# Patient Record
Sex: MALE | Race: OTHER | HISPANIC OR LATINO | URBAN - METROPOLITAN AREA
[De-identification: names, ages, dates, MRNs, and addresses within clinical notes are randomized per-mention and may not be internally consistent; named-entity substitution may affect disease eponyms.]

---

## 2021-12-02 ENCOUNTER — OFFICE VISIT (OUTPATIENT)
Dept: FAMILY MEDICINE | Facility: CLINIC | Age: 46
End: 2021-12-02
Payer: OTHER GOVERNMENT

## 2021-12-02 VITALS — OXYGEN SATURATION: 96 % | HEART RATE: 55 BPM | BODY MASS INDEX: 32.72 KG/M2 | HEIGHT: 71 IN | WEIGHT: 233.69 LBS

## 2021-12-02 DIAGNOSIS — G43.109 MIGRAINE WITH AURA AND WITHOUT STATUS MIGRAINOSUS, NOT INTRACTABLE: Primary | ICD-10-CM

## 2021-12-02 PROCEDURE — 99999 PR PBB SHADOW E&M-NEW PATIENT-LVL IV: ICD-10-PCS | Mod: PBBFAC,,, | Performed by: NURSE PRACTITIONER

## 2021-12-02 PROCEDURE — 99203 PR OFFICE/OUTPT VISIT, NEW, LEVL III, 30-44 MIN: ICD-10-PCS | Mod: S$PBB,,, | Performed by: NURSE PRACTITIONER

## 2021-12-02 PROCEDURE — 99204 OFFICE O/P NEW MOD 45 MIN: CPT | Mod: PBBFAC,PO | Performed by: NURSE PRACTITIONER

## 2021-12-02 PROCEDURE — 99203 OFFICE O/P NEW LOW 30 MIN: CPT | Mod: S$PBB,,, | Performed by: NURSE PRACTITIONER

## 2021-12-02 PROCEDURE — 99999 PR PBB SHADOW E&M-NEW PATIENT-LVL IV: CPT | Mod: PBBFAC,,, | Performed by: NURSE PRACTITIONER

## 2021-12-02 RX ORDER — OMEPRAZOLE 20 MG/1
20 CAPSULE, DELAYED RELEASE ORAL DAILY
COMMUNITY

## 2021-12-02 RX ORDER — AMITRIPTYLINE HYDROCHLORIDE 25 MG/1
25 TABLET, FILM COATED ORAL NIGHTLY PRN
COMMUNITY

## 2021-12-02 RX ORDER — SUMATRIPTAN SUCCINATE 100 MG/1
100 TABLET ORAL
COMMUNITY
End: 2021-12-15

## 2021-12-15 ENCOUNTER — OFFICE VISIT (OUTPATIENT)
Dept: NEUROLOGY | Facility: CLINIC | Age: 46
End: 2021-12-15
Payer: OTHER GOVERNMENT

## 2021-12-15 ENCOUNTER — TELEPHONE (OUTPATIENT)
Dept: FAMILY MEDICINE | Facility: CLINIC | Age: 46
End: 2021-12-15
Payer: OTHER GOVERNMENT

## 2021-12-15 VITALS
HEIGHT: 71 IN | TEMPERATURE: 98 F | HEART RATE: 55 BPM | BODY MASS INDEX: 32.55 KG/M2 | DIASTOLIC BLOOD PRESSURE: 84 MMHG | WEIGHT: 232.5 LBS | RESPIRATION RATE: 17 BRPM | SYSTOLIC BLOOD PRESSURE: 130 MMHG

## 2021-12-15 DIAGNOSIS — G43.109 MIGRAINE WITH AURA AND WITHOUT STATUS MIGRAINOSUS, NOT INTRACTABLE: ICD-10-CM

## 2021-12-15 DIAGNOSIS — G43.719 INTRACTABLE CHRONIC MIGRAINE WITHOUT AURA AND WITHOUT STATUS MIGRAINOSUS: Primary | ICD-10-CM

## 2021-12-15 DIAGNOSIS — G47.30 SLEEP APNEA, UNSPECIFIED TYPE: ICD-10-CM

## 2021-12-15 DIAGNOSIS — M79.18 CERVICAL MYOFASCIAL PAIN SYNDROME: ICD-10-CM

## 2021-12-15 PROCEDURE — 99205 PR OFFICE/OUTPT VISIT, NEW, LEVL V, 60-74 MIN: ICD-10-PCS | Mod: S$PBB,,, | Performed by: NURSE PRACTITIONER

## 2021-12-15 PROCEDURE — 99215 OFFICE O/P EST HI 40 MIN: CPT | Mod: PBBFAC,PO | Performed by: NURSE PRACTITIONER

## 2021-12-15 PROCEDURE — 99205 OFFICE O/P NEW HI 60 MIN: CPT | Mod: S$PBB,,, | Performed by: NURSE PRACTITIONER

## 2021-12-15 PROCEDURE — 99999 PR PBB SHADOW E&M-EST. PATIENT-LVL V: ICD-10-PCS | Mod: PBBFAC,,, | Performed by: NURSE PRACTITIONER

## 2021-12-15 PROCEDURE — 99999 PR PBB SHADOW E&M-EST. PATIENT-LVL V: CPT | Mod: PBBFAC,,, | Performed by: NURSE PRACTITIONER

## 2021-12-15 RX ORDER — ROSUVASTATIN CALCIUM 20 MG/1
20 TABLET, COATED ORAL DAILY
COMMUNITY

## 2021-12-15 RX ORDER — RIZATRIPTAN BENZOATE 10 MG/1
TABLET ORAL
Qty: 10 TABLET | Refills: 11 | Status: SHIPPED | OUTPATIENT
Start: 2021-12-15 | End: 2022-02-21 | Stop reason: ALTCHOICE

## 2021-12-15 RX ORDER — METFORMIN HYDROCHLORIDE 500 MG/1
500 TABLET, EXTENDED RELEASE ORAL
COMMUNITY

## 2021-12-15 RX ORDER — TIZANIDINE 4 MG/1
TABLET ORAL
Qty: 30 TABLET | Refills: 11 | Status: SHIPPED | OUTPATIENT
Start: 2021-12-15

## 2021-12-30 ENCOUNTER — CLINICAL SUPPORT (OUTPATIENT)
Dept: REHABILITATION | Facility: HOSPITAL | Age: 46
End: 2021-12-30
Payer: OTHER GOVERNMENT

## 2021-12-30 DIAGNOSIS — R51.9 CHRONIC INTRACTABLE HEADACHE, UNSPECIFIED HEADACHE TYPE: ICD-10-CM

## 2021-12-30 DIAGNOSIS — M54.2 NECK PAIN: ICD-10-CM

## 2021-12-30 DIAGNOSIS — R26.89 DECREASED FUNCTIONAL MOBILITY: ICD-10-CM

## 2021-12-30 DIAGNOSIS — M79.18 CERVICAL MYOFASCIAL PAIN SYNDROME: ICD-10-CM

## 2021-12-30 DIAGNOSIS — G89.29 CHRONIC INTRACTABLE HEADACHE, UNSPECIFIED HEADACHE TYPE: ICD-10-CM

## 2021-12-30 DIAGNOSIS — G43.719 INTRACTABLE CHRONIC MIGRAINE WITHOUT AURA AND WITHOUT STATUS MIGRAINOSUS: ICD-10-CM

## 2021-12-30 PROCEDURE — 97110 THERAPEUTIC EXERCISES: CPT | Mod: PN

## 2021-12-30 PROCEDURE — 97161 PT EVAL LOW COMPLEX 20 MIN: CPT | Mod: PN

## 2022-01-04 ENCOUNTER — CLINICAL SUPPORT (OUTPATIENT)
Dept: REHABILITATION | Facility: HOSPITAL | Age: 47
End: 2022-01-04
Payer: OTHER GOVERNMENT

## 2022-01-04 DIAGNOSIS — M54.2 NECK PAIN: ICD-10-CM

## 2022-01-04 DIAGNOSIS — R51.9 CHRONIC INTRACTABLE HEADACHE, UNSPECIFIED HEADACHE TYPE: ICD-10-CM

## 2022-01-04 DIAGNOSIS — G89.29 CHRONIC INTRACTABLE HEADACHE, UNSPECIFIED HEADACHE TYPE: ICD-10-CM

## 2022-01-04 DIAGNOSIS — R26.89 DECREASED FUNCTIONAL MOBILITY: ICD-10-CM

## 2022-01-04 PROCEDURE — 97140 MANUAL THERAPY 1/> REGIONS: CPT | Mod: PN,CQ

## 2022-01-04 PROCEDURE — 97110 THERAPEUTIC EXERCISES: CPT | Mod: PN,CQ

## 2022-01-04 NOTE — PROGRESS NOTES
[facility]   Physical Therapy Treatment Note     Name: Yonatan Allen  Clinic Number: [id number]    Therapy Diagnosis:   Encounter Diagnoses   Name Primary?    Neck pain     Chronic intractable headache, unspecified headache type     Decreased functional mobility      Physician: Camryn Webb NP    Visit Date: 1/4/2022    Physician Orders: PT Eval and Treat    Medical Diagnosis from Referral:   G43.719 (ICD-10-CM) - Intractable chronic migraine without aura and without status migrainosus   M79.18 (ICD-10-CM) - Cervical myofascial pain syndrome         Evaluation Date: 12/30/2021  Authorization Period Expiration: 6/30/22  Plan of Care Expiration: 2/25/22  Visit # / Visits authorized: 1/ 12 (+1 from eval)      Precautions: Standard    PTA Visit #: 1/5     Time In: 8:02  Time Out: 8:53  Total Billable Time: 50 minutes    SUBJECTIVE     Pt reports: overall less tight but still definitely feels tight. States his doctor gave him a muscle relaxer that seems to help with sleeping.  He was somewhat compliant with home exercise program, did stretches but not lying down exerises.  Response to previous treatment: felt okay  Functional change: too soon to tell    Pain: 5/10 tightness, not really painful  Location: bilateral tops of shoulders and into neck some      OBJECTIVE     Objective Measures updated at progress report unless specified.     Treatment     YONATAN received the treatments listed below:      therapeutic exercises to develop strength, ROM, flexibility and posture for 35 minutes including:  Supine nods x10, 5 sec hold  Supine cervical retraction x10, 5 sec hold  Prone sh ext x10, 3# (muscle fatigue noted)  Prone horiz abd x 10, 0# (muscle fatigue noted)  Seated upper trap stretch 3x20 sec  Seated levator scap stretch 3x20 sec  Bruegger's 15 x 3 sec  ABCs on wall for scap stab x 1  Lower trap wall slides with lift off x 10 with 3 sec hold (tactile cue occasionally to ensure lower trap  activation)  Sh ext with B tubing 2x10  Corner pec stretch 3x20 sec     May add: scap clocks      manual therapy techniques: Soft tissue Mobilization were applied to the: B UT, cervical paraspinals and suboccipitals for 15 minutes, including:  STM to B UT, cervical paraspinals  Suboccipital release--pt reported 0/10 pain post suboccipital release  Pt educated on self suboccipital release with tennis balls      Patient Education and Home Exercises     Home Exercises Provided and Patient Education Provided     Education provided:   - greater awareness of scapular positioning and avoidance of shoulder hiking  - importance of HEP completion as able for better strength and stability    Written Home Exercises Provided: yes. Exercises were reviewed and ROMARIO was able to demonstrate them prior to the end of the session.  ROMARIO demonstrated good  understanding of the education provided. See EMR under Patient Instructions in Notes section for exercises provided during therapy sessions    ASSESSMENT     Patient demonstrated overall good tolerance to progression of exercises, noted fatigue to mid traps, rhomboids, and lower traps with additional exercises. Moderate tightness continues to bilateral suboccipitals, L > R more tender with manual. Occasional cues throughout session to avoid UT compensation and shoulder hiking. Updated HEP to add some standing exercises and patient is eager to improve postural strength.    ROMARIO Is progressing well towards his goals.   Pt prognosis is Good.     Pt will continue to benefit from skilled outpatient physical therapy to address the deficits listed in the problem list box on initial evaluation, provide pt/family education and to maximize pt's level of independence in the home and community environment.     Pt's spiritual, cultural and educational needs considered and pt agreeable to plan of care and goals.     Anticipated barriers to physical therapy: work schedule    Goals:   Short Term  Goals: 4 weeks (progressing, not met)  1.Report decreased neck and headache pain  <   / =  4  /10  to increase tolerance for ADLs  2. Pt will report decreased headaches to 2x/week for increased tolerance for ADLs  3. Increased R UE strength by 1/3 muscle grade in all deficient planes to increase tolerance for ADL and work activities.  4.  Increased L UE strength by 1/3 muscle grade in all deficient planes to increase tolerance for ADL and work activities.  5. Pt to tolerate HEP to improve ROM and independence with ADL's        Long Term Goals: 8 weeks (progressing, not met)  1.Report decreased neck/headache pain  <   / =  2  /10  to increase tolerance for ADLs  2.Pt will report decreased headaches to 1x/week for increased tolerance for ADLs  3.Increased R UE strength by 1 muscle grade in all deficient planes  to increase tolerance for ADL and work activities.  4. Increased L UE strength by 1 muscle grade in all deficient planes  to increase tolerance for ADL and work activities.  5. Pt to be Independent with HEP to improve ROM and independence with ADL's  6. Increased MMT  for lower traps/middle traps/rhomboids to > or = 4+/5 to increase tolerance for ADL and improve posture.      PLAN     Continue with current plan of care to progress strength, endurance, and mobility toward established therapy goals.    Karolina Schuster, PTA

## 2022-01-05 ENCOUNTER — TELEPHONE (OUTPATIENT)
Dept: NEUROLOGY | Facility: CLINIC | Age: 47
End: 2022-01-05
Payer: OTHER GOVERNMENT

## 2022-01-06 ENCOUNTER — CLINICAL SUPPORT (OUTPATIENT)
Dept: REHABILITATION | Facility: HOSPITAL | Age: 47
End: 2022-01-06
Payer: OTHER GOVERNMENT

## 2022-01-06 DIAGNOSIS — R51.9 CHRONIC INTRACTABLE HEADACHE, UNSPECIFIED HEADACHE TYPE: ICD-10-CM

## 2022-01-06 DIAGNOSIS — G89.29 CHRONIC INTRACTABLE HEADACHE, UNSPECIFIED HEADACHE TYPE: ICD-10-CM

## 2022-01-06 DIAGNOSIS — M54.2 NECK PAIN: ICD-10-CM

## 2022-01-06 DIAGNOSIS — R26.89 DECREASED FUNCTIONAL MOBILITY: ICD-10-CM

## 2022-01-06 PROCEDURE — 97110 THERAPEUTIC EXERCISES: CPT | Mod: PN,CQ

## 2022-01-06 PROCEDURE — 97140 MANUAL THERAPY 1/> REGIONS: CPT | Mod: PN,CQ

## 2022-01-06 NOTE — PROGRESS NOTES
[facility]   Physical Therapy Treatment Note     Name: Yonatan Allen  Clinic Number: [id number]    Therapy Diagnosis:   Encounter Diagnoses   Name Primary?    Neck pain     Chronic intractable headache, unspecified headache type     Decreased functional mobility      Physician: Camryn Webb NP    Visit Date: 1/6/2022    Physician Orders: PT Eval and Treat    Medical Diagnosis from Referral:   G43.719 (ICD-10-CM) - Intractable chronic migraine without aura and without status migrainosus   M79.18 (ICD-10-CM) - Cervical myofascial pain syndrome         Evaluation Date: 12/30/2021  Authorization Period Expiration: 6/30/22  Plan of Care Expiration: 2/25/22  Visit # / Visits authorized: 2/ 12 (+1 from eval)      Precautions: Standard    PTA Visit #: 2/5     Time In: 7:00  Time Out: 7:58  Total Billable Time: 57 minutes    SUBJECTIVE     Pt reports: overall less tight but still definitely feels tight. States his doctor gave him a muscle relaxer that seems to help with sleeping. Notes occasional dizziness that occurs for about 4 years now.   He was somewhat compliant with home exercise program, did stretches but not lying down exerises.  Response to previous treatment: felt okay  Functional change: too soon to tell    Pain: 5/10 tightness, not really painful  Location: bilateral tops of shoulders and into neck some      OBJECTIVE     Objective Measures updated at progress report unless specified.     Treatment     YONATAN received the treatments listed below:      therapeutic exercises to develop strength, ROM, flexibility and posture for 40 minutes including:  Supine pec minor stretch over 1/2 roll x 1 min  Supine pec major stretch over 1/2 roll x 1 min  Supine scap retraction over 1/2 roll 15 x 3 sec hold  Supine nods x10, 5 sec hold  Supine cervical retraction x10, 5 sec hold  Prone sh ext x15, 3# (muscle fatigue noted)  Prone horiz abd x 15, 0# (add weight next time)  Seated upper trap  stretch 3x20 sec  Seated levator scap stretch 3x20 sec  Bruegger's YTB 15 x 3 sec  ABCs on wall for scap stab x 1  Lower trap wall slides with lift off x 10 with 3 sec hold - onset of slight dizziness due to closeness of wall  Sh ext with B tubing 2x10  Corner pec stretch 3x20 sec (one in each position of high, mid, and low)     May add: scap clocks, seated VOR       manual therapy techniques: Soft tissue Mobilization were applied to the: B UT, cervical paraspinals and suboccipitals for 17 minutes, including:  STM to B UT, cervical paraspinals  Suboccipital release--pt reported 0/10 pain post suboccipital release  Prone (with blue pillow) STM to B Levator scap/UT and inferior scapular glides       Patient Education and Home Exercises     Home Exercises Provided and Patient Education Provided     Education provided:   - greater awareness of scapular positioning and avoidance of shoulder hiking  - importance of HEP completion as able for better strength and stability  - reminder of use of tennis balls for suboccipital release as needed    Written Home Exercises Provided: yes. Exercises were reviewed and YONATAN was able to demonstrate them prior to the end of the session.  YONATAN demonstrated good  understanding of the education provided. See EMR under Patient Instructions in Notes section for exercises provided during therapy sessions    ASSESSMENT     Yonatan presents today with continued tightness along the suboccipitals and bilateral UT and levator scapulae, which lessened with manual techniques. Tolerated additional pec stretching well, further relief of tightness noted. Improved endurance noted with all exercises, some verbal cues for technique required with lower trap wall slides. Patient also demonstrated slight onset of dizziness with wall slides, which recovered with rest. Dizziness could be related to historical injuries and may benefit from VOR exercises. Patient will benefit from continued progression for  postural strengthening.    ROMARIO Is progressing well towards his goals.   Pt prognosis is Good.     Pt will continue to benefit from skilled outpatient physical therapy to address the deficits listed in the problem list box on initial evaluation, provide pt/family education and to maximize pt's level of independence in the home and community environment.     Pt's spiritual, cultural and educational needs considered and pt agreeable to plan of care and goals.     Anticipated barriers to physical therapy: work schedule    Goals:   Short Term Goals: 4 weeks (progressing, not met)  1.Report decreased neck and headache pain  <   / =  4  /10  to increase tolerance for ADLs  2. Pt will report decreased headaches to 2x/week for increased tolerance for ADLs  3. Increased R UE strength by 1/3 muscle grade in all deficient planes to increase tolerance for ADL and work activities.  4.  Increased L UE strength by 1/3 muscle grade in all deficient planes to increase tolerance for ADL and work activities.  5. Pt to tolerate HEP to improve ROM and independence with ADL's        Long Term Goals: 8 weeks (progressing, not met)  1.Report decreased neck/headache pain  <   / =  2  /10  to increase tolerance for ADLs  2.Pt will report decreased headaches to 1x/week for increased tolerance for ADLs  3.Increased R UE strength by 1 muscle grade in all deficient planes  to increase tolerance for ADL and work activities.  4. Increased L UE strength by 1 muscle grade in all deficient planes  to increase tolerance for ADL and work activities.  5. Pt to be Independent with HEP to improve ROM and independence with ADL's  6. Increased MMT  for lower traps/middle traps/rhomboids to > or = 4+/5 to increase tolerance for ADL and improve posture.      PLAN     Continue with current plan of care to progress strength, endurance, and mobility toward established therapy goals. Possible look at vestibular or VOR.     Karolina Schuster, PTA

## 2022-01-10 ENCOUNTER — PROCEDURE VISIT (OUTPATIENT)
Dept: NEUROLOGY | Facility: CLINIC | Age: 47
End: 2022-01-10
Payer: OTHER GOVERNMENT

## 2022-01-10 VITALS
TEMPERATURE: 98 F | HEIGHT: 71 IN | WEIGHT: 231.25 LBS | RESPIRATION RATE: 17 BRPM | DIASTOLIC BLOOD PRESSURE: 91 MMHG | BODY MASS INDEX: 32.37 KG/M2 | SYSTOLIC BLOOD PRESSURE: 137 MMHG | HEART RATE: 69 BPM

## 2022-01-10 DIAGNOSIS — G43.719 INTRACTABLE CHRONIC MIGRAINE WITHOUT AURA AND WITHOUT STATUS MIGRAINOSUS: Primary | ICD-10-CM

## 2022-01-10 PROCEDURE — 64615 PR CHEMODENERVATION OF MUSCLE FOR CHRONIC MIGRAINE: ICD-10-PCS | Mod: S$PBB,,, | Performed by: NURSE PRACTITIONER

## 2022-01-10 PROCEDURE — 64615 CHEMODENERV MUSC MIGRAINE: CPT | Mod: S$PBB,,, | Performed by: NURSE PRACTITIONER

## 2022-01-10 PROCEDURE — 64615 CHEMODENERV MUSC MIGRAINE: CPT | Mod: PBBFAC,PO | Performed by: NURSE PRACTITIONER

## 2022-01-10 RX ORDER — TRIAMCINOLONE ACETONIDE 1 MG/G
CREAM TOPICAL
COMMUNITY
Start: 2021-11-18

## 2022-01-10 RX ADMIN — ONABOTULINUMTOXINA 200 UNITS: 100 INJECTION, POWDER, LYOPHILIZED, FOR SOLUTION INTRADERMAL; INTRAMUSCULAR at 09:01

## 2022-01-10 NOTE — PROCEDURES

## 2022-01-11 ENCOUNTER — LAB VISIT (OUTPATIENT)
Dept: PRIMARY CARE CLINIC | Facility: CLINIC | Age: 47
End: 2022-01-11
Payer: OTHER GOVERNMENT

## 2022-01-11 DIAGNOSIS — Z20.822 CONTACT WITH AND (SUSPECTED) EXPOSURE TO COVID-19: ICD-10-CM

## 2022-01-11 LAB
CTP QC/QA: YES
SARS-COV-2 AG RESP QL IA.RAPID: POSITIVE

## 2022-01-11 PROCEDURE — 87811 SARS-COV-2 COVID19 W/OPTIC: CPT

## 2022-01-16 DIAGNOSIS — U07.1 COVID-19 VIRUS DETECTED: ICD-10-CM

## 2022-01-19 ENCOUNTER — CLINICAL SUPPORT (OUTPATIENT)
Dept: REHABILITATION | Facility: HOSPITAL | Age: 47
End: 2022-01-19
Payer: OTHER GOVERNMENT

## 2022-01-19 DIAGNOSIS — M54.2 NECK PAIN: ICD-10-CM

## 2022-01-19 DIAGNOSIS — R51.9 CHRONIC INTRACTABLE HEADACHE, UNSPECIFIED HEADACHE TYPE: ICD-10-CM

## 2022-01-19 DIAGNOSIS — G89.29 CHRONIC INTRACTABLE HEADACHE, UNSPECIFIED HEADACHE TYPE: ICD-10-CM

## 2022-01-19 DIAGNOSIS — R26.89 DECREASED FUNCTIONAL MOBILITY: ICD-10-CM

## 2022-01-19 PROCEDURE — 97140 MANUAL THERAPY 1/> REGIONS: CPT | Mod: PN,CQ

## 2022-01-19 PROCEDURE — 97110 THERAPEUTIC EXERCISES: CPT | Mod: PN,CQ

## 2022-01-19 NOTE — PROGRESS NOTES
[facility]   Physical Therapy Treatment Note     Name: Yonatan Allen  Clinic Number: [id number]    Therapy Diagnosis:   Encounter Diagnoses   Name Primary?    Neck pain     Chronic intractable headache, unspecified headache type     Decreased functional mobility      Physician: Camryn Webb NP    Visit Date: 1/19/2022    Physician Orders: PT Eval and Treat    Medical Diagnosis from Referral:   G43.719 (ICD-10-CM) - Intractable chronic migraine without aura and without status migrainosus   M79.18 (ICD-10-CM) - Cervical myofascial pain syndrome         Evaluation Date: 12/30/2021  Authorization Period Expiration: 6/30/22  Plan of Care Expiration: 2/25/22  Visit # / Visits authorized: 3/ 12 (+1 from eval)      Precautions: Standard    PTA Visit #: 3/5     Time In: 4:05  Time Out: 5:03  Total Billable Time: 35 minutes    SUBJECTIVE     Pt reports: overall improvement of symptoms, has not had a headache in a bout a week now. Received botox injections last week Monday and seems to have less bouts of dizziness since then as well  He was somewhat compliant with home exercise program, did stretches but not lying down exerises.  Response to previous treatment: felt okay  Functional change: too soon to tell    Pain: 2/10 tightness, not really painful  Location: bilateral tops of shoulders and into neck some      OBJECTIVE     Objective Measures updated at progress report unless specified.     Treatment     YONATAN received the treatments listed below:      therapeutic exercises to develop strength, ROM, flexibility and posture for 40 minutes including:  Supine pec minor stretch over 1/2 roll 2 x 1 min  Supine pec major stretch over 1/2 roll 2 x 1 min  Supine scap retraction over 1/2 roll 15 x 3 sec hold  Supine nods x10, 5 sec hold  Supine cervical retraction x10, 5 sec hold  Prone sh ext x15, 3# (muscle fatigue noted)  Prone horiz abd x 15, 0# (add weight next time)  Seated upper trap stretch  "3x20 sec  Seated levator scap stretch 3x20 sec  Bruegger's YTB 15 x 3 sec  ABCs on wall for scap stab x 1  Lower trap wall slides with lift off x 10 with 3 sec hold - onset of slight dizziness due to closeness of wall  Sh ext with B tubing 2x10  Row "W" B tubing x 10 (add squat next time)  Corner pec stretch 3x20 sec (one in each position of high, mid, and low)     May add: scap clocks, seated VOR       manual therapy techniques: Soft tissue Mobilization were applied to the: B UT, cervical paraspinals and suboccipitals for 15 minutes, including:  STM to B UT, cervical paraspinals  Suboccipital release--pt reported 0/10 pain post suboccipital release  (NP today) Prone (with blue pillow) STM to B Levator scap/UT and inferior scapular glides       Patient Education and Home Exercises     Home Exercises Provided and Patient Education Provided     Education provided:   - greater awareness of scapular positioning and avoidance of shoulder hiking  - importance of HEP completion as able for better strength and stability  - reminder of use of tennis balls for suboccipital release as needed    Written Home Exercises Provided: yes. Exercises were reviewed and YONATAN was able to demonstrate them prior to the end of the session.  YONATAN demonstrated good  understanding of the education provided. See EMR under Patient Instructions in Notes section for exercises provided during therapy sessions    ASSESSMENT     Yonatan tolerated progression of exercises well this date without onset of dizziness or headache throughout session. Mild decrease of tightness overall throughout neck and upper traps though some tightness continues along bilateral suboccipitals which lessened with manual techniques and stretching. Increased endurance and technique noted with all exercises today, and was able to progress well for strength and endurance. Patient will benefit from continued progression for postural strengthening as able.    YONATAN Is progressing well " towards his goals.   Pt prognosis is Good.     Pt will continue to benefit from skilled outpatient physical therapy to address the deficits listed in the problem list box on initial evaluation, provide pt/family education and to maximize pt's level of independence in the home and community environment.     Pt's spiritual, cultural and educational needs considered and pt agreeable to plan of care and goals.     Anticipated barriers to physical therapy: work schedule    Goals:   Short Term Goals: 4 weeks (progressing, not met)  1.Report decreased neck and headache pain  <   / =  4  /10  to increase tolerance for ADLs  2. Pt will report decreased headaches to 2x/week for increased tolerance for ADLs  3. Increased R UE strength by 1/3 muscle grade in all deficient planes to increase tolerance for ADL and work activities.  4.  Increased L UE strength by 1/3 muscle grade in all deficient planes to increase tolerance for ADL and work activities.  5. Pt to tolerate HEP to improve ROM and independence with ADL's        Long Term Goals: 8 weeks (progressing, not met)  1.Report decreased neck/headache pain  <   / =  2  /10  to increase tolerance for ADLs  2.Pt will report decreased headaches to 1x/week for increased tolerance for ADLs  3.Increased R UE strength by 1 muscle grade in all deficient planes  to increase tolerance for ADL and work activities.  4. Increased L UE strength by 1 muscle grade in all deficient planes  to increase tolerance for ADL and work activities.  5. Pt to be Independent with HEP to improve ROM and independence with ADL's  6. Increased MMT  for lower traps/middle traps/rhomboids to > or = 4+/5 to increase tolerance for ADL and improve posture.      PLAN     Continue with current plan of care to progress strength, endurance, and mobility toward established therapy goals. Possible look at vestibular or VOR.     Karolina Schuster, PTA

## 2022-01-19 NOTE — PROGRESS NOTES
[facility]   Physical Therapy Treatment Note     Name: Yonatan Allen  Clinic Number: [id number]    Therapy Diagnosis:   Encounter Diagnoses   Name Primary?    Neck pain     Chronic intractable headache, unspecified headache type     Decreased functional mobility      Physician: Camryn Webb NP    Visit Date: 1/20/2022    Physician Orders: PT Eval and Treat    Medical Diagnosis from Referral:   G43.719 (ICD-10-CM) - Intractable chronic migraine without aura and without status migrainosus   M79.18 (ICD-10-CM) - Cervical myofascial pain syndrome         Evaluation Date: 12/30/2021  Authorization Period Expiration: 6/30/22  Plan of Care Expiration: 2/25/22  Visit # / Visits authorized: 3/ 12 (+1 from eval)      Precautions: Standard    PTA Visit #: 3/5     Time In: 11:00  Time Out: 12:00  Total Billable Time: 53 minutes    SUBJECTIVE     Pt reports: it's been at least a week since he's had a headache or neck pain.  His neck muscles feel looser, but he has tightness to low back.  He was somewhat compliant with home exercise program, did stretches but not lying down exerises.  Response to previous treatment: felt okay  Functional change: too soon to tell    Pain: 0/10 to neck, 4-5/10 to low back  Location: bilateral tops of shoulders and into neck some      OBJECTIVE     TTP and tightness to R lumbar paraspinal     Treatment     YONATAN received the treatments listed below:      therapeutic exercises to develop strength, ROM, flexibility and posture for 45 minutes including:  Child's pose 2x20 sec  DKTC 3x20 sec  LTR 3x20 sec ea  Supine pec minor stretch over 1/2 roll 2 x 1 min  Supine pec major stretch over 1/2 roll 2 x 1 min  Supine scap retraction over 1/2 roll 15 x 3 sec hold  Seated nods x10, 5 sec hold  Seated cervical retraction x10, 5 sec hold  Prone over red tball and kneeling on foam sh ext x15, 3# (muscle fatigue noted)  Prone over red tball and kneeling on foam horiz abd x 15, 2#  "  Seated upper trap stretch 3x20 sec  Seated levator scap stretch 3x20 sec  Bruegger's YTB 15 x 3 sec  NP ABCs on wall for scap stab x 1  NP Lower trap wall slides with lift off x 10 with 3 sec hold - onset of slight dizziness due to closeness of wall  Sh ext with B tubing 2x10  squat "W" row B tubing 2x10  NP Corner pec stretch 1x20 sec ea (one in each position of high, mid, and low)  scap clocks large Y sport loop x5 ea     May add: seated VOR       manual therapy techniques: Soft tissue Mobilization were applied to the: B UT, cervical paraspinals and suboccipitals for 8 minutes, including:  STM to B UT, cervical paraspinals  Suboccipital release--pt reported 0/10 pain post suboccipital release       Patient Education and Home Exercises     Home Exercises Provided and Patient Education Provided     Education provided:   - pillow between knees in sidelying  - lumbar flexion stretches to relieve tightness to low back and improve posture  - reminder of use of tennis balls for suboccipital release as needed    Written Home Exercises Provided: yes. Exercises were reviewed and YONATAN was able to demonstrate them prior to the end of the session.  YONATAN demonstrated good  understanding of the education provided. See EMR under Patient Instructions in Notes section for exercises provided during therapy sessions    ASSESSMENT     Yonatan reported decreased overall neck pain and tightness, but reported some tightness to R low back.  Lower back loosened with lumbar flexion stretches.  Pt cued to perform TA set for standing exercises.  He is improving with decreased tightness to cervical musculature and improved postural awareness.  Pt without any bouts of dizziness today.   Patient will benefit from continued progression for postural strengthening as able.    YONATAN Is progressing well towards his goals.   Pt prognosis is Good.     Pt will continue to benefit from skilled outpatient physical therapy to address the deficits listed in " the problem list box on initial evaluation, provide pt/family education and to maximize pt's level of independence in the home and community environment.     Pt's spiritual, cultural and educational needs considered and pt agreeable to plan of care and goals.     Anticipated barriers to physical therapy: work schedule    Goals:   Short Term Goals: 4 weeks (progressing, not met)  1.Report decreased neck and headache pain  <   / =  4  /10  to increase tolerance for ADLs  2. Pt will report decreased headaches to 2x/week for increased tolerance for ADLs  3. Increased R UE strength by 1/3 muscle grade in all deficient planes to increase tolerance for ADL and work activities.  4.  Increased L UE strength by 1/3 muscle grade in all deficient planes to increase tolerance for ADL and work activities.  5. Pt to tolerate HEP to improve ROM and independence with ADL's        Long Term Goals: 8 weeks (progressing, not met)  1.Report decreased neck/headache pain  <   / =  2  /10  to increase tolerance for ADLs  2.Pt will report decreased headaches to 1x/week for increased tolerance for ADLs  3.Increased R UE strength by 1 muscle grade in all deficient planes  to increase tolerance for ADL and work activities.  4. Increased L UE strength by 1 muscle grade in all deficient planes  to increase tolerance for ADL and work activities.  5. Pt to be Independent with HEP to improve ROM and independence with ADL's  6. Increased MMT  for lower traps/middle traps/rhomboids to > or = 4+/5 to increase tolerance for ADL and improve posture.      PLAN     Continue with current plan of care to progress strength, endurance, and mobility toward established therapy goals.    Anusha Meraz, PT

## 2022-01-20 ENCOUNTER — CLINICAL SUPPORT (OUTPATIENT)
Dept: REHABILITATION | Facility: HOSPITAL | Age: 47
End: 2022-01-20
Payer: OTHER GOVERNMENT

## 2022-01-20 DIAGNOSIS — M54.2 NECK PAIN: ICD-10-CM

## 2022-01-20 DIAGNOSIS — R26.89 DECREASED FUNCTIONAL MOBILITY: ICD-10-CM

## 2022-01-20 DIAGNOSIS — R51.9 CHRONIC INTRACTABLE HEADACHE, UNSPECIFIED HEADACHE TYPE: ICD-10-CM

## 2022-01-20 DIAGNOSIS — G89.29 CHRONIC INTRACTABLE HEADACHE, UNSPECIFIED HEADACHE TYPE: ICD-10-CM

## 2022-01-20 PROCEDURE — 97140 MANUAL THERAPY 1/> REGIONS: CPT | Mod: PN

## 2022-01-20 PROCEDURE — 97110 THERAPEUTIC EXERCISES: CPT | Mod: PN

## 2022-01-24 ENCOUNTER — CLINICAL SUPPORT (OUTPATIENT)
Dept: REHABILITATION | Facility: HOSPITAL | Age: 47
End: 2022-01-24
Payer: OTHER GOVERNMENT

## 2022-01-24 DIAGNOSIS — R51.9 CHRONIC INTRACTABLE HEADACHE, UNSPECIFIED HEADACHE TYPE: ICD-10-CM

## 2022-01-24 DIAGNOSIS — R26.89 DECREASED FUNCTIONAL MOBILITY: ICD-10-CM

## 2022-01-24 DIAGNOSIS — M54.2 NECK PAIN: ICD-10-CM

## 2022-01-24 DIAGNOSIS — G89.29 CHRONIC INTRACTABLE HEADACHE, UNSPECIFIED HEADACHE TYPE: ICD-10-CM

## 2022-01-24 PROCEDURE — 97140 MANUAL THERAPY 1/> REGIONS: CPT | Mod: PN

## 2022-01-24 PROCEDURE — 97110 THERAPEUTIC EXERCISES: CPT | Mod: PN

## 2022-01-24 NOTE — PROGRESS NOTES
[facility]   Physical Therapy Treatment Note     Name: Yonatan Allen  Clinic Number: [id number]    Therapy Diagnosis:   Encounter Diagnoses   Name Primary?    Neck pain     Chronic intractable headache, unspecified headache type     Decreased functional mobility      Physician: Camryn Webb NP    Visit Date: 1/24/2022    Physician Orders: PT Eval and Treat    Medical Diagnosis from Referral:   G43.719 (ICD-10-CM) - Intractable chronic migraine without aura and without status migrainosus   M79.18 (ICD-10-CM) - Cervical myofascial pain syndrome         Evaluation Date: 12/30/2021  Authorization Period Expiration: 6/30/22  Plan of Care Expiration: 2/25/22  Visit # / Visits authorized: 4/ 12 (+1 from eval)      Precautions: Standard    PTA Visit #: 3/5     Time In: 3:05  Time Out: 4:00  Total Billable Time: 45 minutes    SUBJECTIVE     Pt reports: he's had very light headaches that go away within 30 min with suboccipital release with tennis balls.  He states his low back feels looser with the stretches.  He has some tightness to UT.  He was somewhat compliant with home exercise program, did stretches but not lying down exerises.  Response to previous treatment: felt okay  Functional change: too soon to tell    Pain: 3/10 to neck  Location: bilateral tops of shoulders and into neck some      OBJECTIVE     No objective measures taken today.    Treatment     YONATAN received the treatments listed below:      therapeutic exercises to develop strength, ROM, flexibility and posture for 30 minutes including:  Supine pec minor stretch over 1/2 roll 2 x 1 min  Supine pec major stretch over 1/2 roll 2 x 1 min  Supine scap retraction over 1/2 roll 15 x 3 sec hold  Seated nods x10, 5 sec hold  Seated cervical retraction x10, 5 sec hold  Prone over red tball and kneeling on foam sh ext x15, 3#  Prone over red tball and kneeling on foam horiz abd x 15, 2#  (muscle fatigue noted)  Seated upper trap  "stretch 3x20 sec  Seated levator scap stretch 3x20 sec  squat "W" row B tubing 2x10    Not performed due to time:  Bruegger's YTB 15 x 3 sec  NABCs on wall for scap stab x 1  Lower trap wall slides with lift off x 10 with 3 sec hold - onset of slight dizziness due to closeness of wall  Sh ext with B tubing 2x10  NP Corner pec stretch 1x20 sec ea (one in each position of high, mid, and low)  scap clocks large Y sport loop x5 ea     May add: seated VOR       manual therapy techniques: Soft tissue Mobilization were applied to the: B UT, cervical paraspinals and suboccipitals for 15 minutes, including:  STM to B UT, cervical paraspinals  Pt cleared of contraindications and verbal and written consent acquired. Pt given option of copy of consent form. Pt educated on benefits and potential side effects of DN. FDN performed to B UT (40mm, fanning and winding). FDN performed to reduce pain and muscle tension, promote blood flow, and improve ROM and function ). Pt tolerated tx well without adverse effects. Pt was educated on what to expect following the procedure and he verbalized understanding.       Patient Education and Home Exercises     Home Exercises Provided and Patient Education Provided     Education provided:   - moist heat for decreased soreness after dry needling  Pt gave verbal understanding to all education provided    Written Home Exercises Provided: yes. Exercises were reviewed and YONATAN was able to demonstrate them prior to the end of the session.  YONATAN demonstrated good  understanding of the education provided. See EMR under Patient Instructions in Notes section for exercises provided during therapy sessions    ASSESSMENT     Yonatan presented with tightness to B UT, which loosened with manual and dry needling.  He reported some pulling to L levator scap after nods, which improved with levator scap stretch.  Patient will benefit from continued progression for postural strengthening as able.    YONATAN Is " progressing well towards his goals.   Pt prognosis is Good.     Pt will continue to benefit from skilled outpatient physical therapy to address the deficits listed in the problem list box on initial evaluation, provide pt/family education and to maximize pt's level of independence in the home and community environment.     Pt's spiritual, cultural and educational needs considered and pt agreeable to plan of care and goals.     Anticipated barriers to physical therapy: work schedule    Goals:   Short Term Goals: 4 weeks (progressing, not met)  1.Report decreased neck and headache pain  <   / =  4  /10  to increase tolerance for ADLs  2. Pt will report decreased headaches to 2x/week for increased tolerance for ADLs  3. Increased R UE strength by 1/3 muscle grade in all deficient planes to increase tolerance for ADL and work activities.  4.  Increased L UE strength by 1/3 muscle grade in all deficient planes to increase tolerance for ADL and work activities.  5. Pt to tolerate HEP to improve ROM and independence with ADL's        Long Term Goals: 8 weeks (progressing, not met)  1.Report decreased neck/headache pain  <   / =  2  /10  to increase tolerance for ADLs  2.Pt will report decreased headaches to 1x/week for increased tolerance for ADLs  3.Increased R UE strength by 1 muscle grade in all deficient planes  to increase tolerance for ADL and work activities.  4. Increased L UE strength by 1 muscle grade in all deficient planes  to increase tolerance for ADL and work activities.  5. Pt to be Independent with HEP to improve ROM and independence with ADL's  6. Increased MMT  for lower traps/middle traps/rhomboids to > or = 4+/5 to increase tolerance for ADL and improve posture.      PLAN     Continue with current plan of care to progress strength, endurance, and mobility toward established therapy goals.  Progress periscap and postural strength as tolerated and perform manual and dry needling as needed.    Anusha  Kt, PT

## 2022-02-02 ENCOUNTER — CLINICAL SUPPORT (OUTPATIENT)
Dept: REHABILITATION | Facility: HOSPITAL | Age: 47
End: 2022-02-02
Payer: OTHER GOVERNMENT

## 2022-02-02 ENCOUNTER — DOCUMENTATION ONLY (OUTPATIENT)
Dept: REHABILITATION | Facility: HOSPITAL | Age: 47
End: 2022-02-02
Payer: OTHER GOVERNMENT

## 2022-02-02 DIAGNOSIS — G89.29 CHRONIC INTRACTABLE HEADACHE, UNSPECIFIED HEADACHE TYPE: ICD-10-CM

## 2022-02-02 DIAGNOSIS — R26.89 DECREASED FUNCTIONAL MOBILITY: ICD-10-CM

## 2022-02-02 DIAGNOSIS — M54.2 NECK PAIN: ICD-10-CM

## 2022-02-02 DIAGNOSIS — R51.9 CHRONIC INTRACTABLE HEADACHE, UNSPECIFIED HEADACHE TYPE: ICD-10-CM

## 2022-02-02 PROCEDURE — 97110 THERAPEUTIC EXERCISES: CPT | Mod: PN

## 2022-02-02 NOTE — PROGRESS NOTES
[facility]   Physical Therapy Treatment Note     Name: Yonatan Allen  Clinic Number: [id number]    Therapy Diagnosis:   Encounter Diagnoses   Name Primary?    Neck pain     Chronic intractable headache, unspecified headache type     Decreased functional mobility      Physician: Camryn Webb NP    Visit Date: 2/2/2022    Physician Orders: PT Eval and Treat    Medical Diagnosis from Referral:   G43.719 (ICD-10-CM) - Intractable chronic migraine without aura and without status migrainosus   M79.18 (ICD-10-CM) - Cervical myofascial pain syndrome         Evaluation Date: 12/30/2021  Authorization Period Expiration: 6/30/22  Plan of Care Expiration: 2/25/22 (reassessed on 2/2/22)  Visit # / Visits authorized: 5/ 12 (+1 from eval)      Precautions: Standard    PTA Visit #: 3/5     Time In: 5:05  Time Out: 6:00  Total Billable Time: 30 minutes (1:1)    SUBJECTIVE     Pt reports: he's only had 2 headaches in the last 2 weeks.  He is feeling better overall.  He was somewhat compliant with home exercise program, did stretches but not lying down exerises.  Response to previous treatment: felt okay  Functional change: decreased frequency of headaches     Pain: 3/10 to neck  Location: bilateral tops of shoulders and into neck some      OBJECTIVE     Posture: slight rounded shoulders and fwd head    Cervical Range of Motion:    Degrees Pain   Flexion 58 no     Extension 58 no     Right Rotation 60 no     Left Rotation 62 no     Right Side Bending 50 no   Left Side Bending 50 no      Shoulder Range of Motion:   WFL B, but tightness with ER and IR B    Strength:  Cervical MMT   Flexion 5/5    Extension 5/5   Right Side Bend 5/5   Left Side Bend 5/5     Upper Extremity Strength  (R) UE  (L) UE    Shoulder flexion: 5/5 Shoulder flexion: 5/5   Shoulder Abduction: 5/5 Shoulder abduction: 5/5   Shoulder ER 5/5 Shoulder ER 5/5   Shoulder IR 5/5 Shoulder IR 5/5   Lower Trap 4+/5 Lower Trap 4+/5   Middle  "Trap 5/5 Middle Trap 4+/5   Rhomboids 5/5 Rhomboids 5/5       Special Tests:  Sharp-Josiane -   Lateral Flexion Alar Ligament intact       Joint Mobility: hypomobile throughout cspine B with lateral glides possibly due somewhat to muscle guarding    Palpation: TTP and tightness to R levator scap      Sensation: grossly intact to light touch        Treatment     ROMARIO received the treatments listed below:      therapeutic exercises to develop strength, ROM, flexibility and posture for 50 minutes including:  Reassessment  Supine pec minor stretch over 1/2 roll 2 x 1 min  Supine pec major stretch over 1/2 roll 2 x 1 min  Supine scap retraction over 1/2 roll 15 x 3 sec hold  Seated nods x10, 5 sec hold  Seated cervical retraction x10, 5 sec hold  Prone over red tball and kneeling on foam sh ext x15, 3#  Prone over red tball and kneeling on foam horiz abd x 15, 2#  (muscle fatigue noted)  Seated upper trap stretch 3x20 sec  Seated levator scap stretch 3x20 sec  squat "W" row B tubing 2x10  Sh ext with B tubing x15  scap clocks large Y sport loop x5 ea  Bruegger's RTB 15 x 3 sec    Not performed due to time:  ABCs on wall for scap stab x 1  Lower trap wall slides with lift off x 10 with 3 sec hold - onset of slight dizziness due to closeness of wall  Corner pec stretch 1x20 sec ea (one in each position of high, mid, and low)    May add: seated VOR       manual therapy techniques: Soft tissue Mobilization were applied to the: B UT, cervical paraspinals and suboccipitals for 5 minutes, including:  STM to R levator scap         Patient Education and Home Exercises     Home Exercises Provided and Patient Education Provided     Education provided:   - moist heat for decreased soreness after dry needling  Pt gave verbal understanding to all education provided    Written Home Exercises Provided: pt instructed to continue previous HEP. Exercises were reviewed and ROMARIO was able to demonstrate them prior to the end of the session.  " YONATAN demonstrated good  understanding of the education provided. See EMR under Patient Instructions in Notes section for exercises provided during therapy sessions    ASSESSMENT     Yonatan was reassessed today.  He met all short term goals.  He had some tightness to R levator scap which loosened with manual and stretches.   Patient will benefit from continued progression for postural strengthening as able.    YONATAN Is progressing well towards his goals.   Pt prognosis is Good.     Pt will continue to benefit from skilled outpatient physical therapy to address the deficits listed in the problem list box on initial evaluation, provide pt/family education and to maximize pt's level of independence in the home and community environment.     Pt's spiritual, cultural and educational needs considered and pt agreeable to plan of care and goals.     Anticipated barriers to physical therapy: work schedule    Goals:   Short Term Goals: 4 weeks   1.Report decreased neck and headache pain  <   / =  4  /10  to increase tolerance for ADLs (met)  2. Pt will report decreased headaches to 2x/week for increased tolerance for ADLs (met)  3. Increased R UE strength by 1/3 muscle grade in all deficient planes to increase tolerance for ADL and work activities. (Met)  4.  Increased L UE strength by 1/3 muscle grade in all deficient planes to increase tolerance for ADL and work activities. (met)  5. Pt to tolerate HEP to improve ROM and independence with ADL's (met)        Long Term Goals: 8 weeks (progressing, not met)  1.Report decreased neck/headache pain  <   / =  2  /10  to increase tolerance for ADLs  2.Pt will report decreased headaches to 1x/week for increased tolerance for ADLs  3.Increased R UE strength by 1 muscle grade in all deficient planes  to increase tolerance for ADL and work activities.  4. Increased L UE strength by 1 muscle grade in all deficient planes  to increase tolerance for ADL and work activities.  5. Pt to be  Independent with HEP to improve ROM and independence with ADL's  6. Increased MMT  for lower traps/middle traps/rhomboids to > or = 4+/5 to increase tolerance for ADL and improve posture.    Unmet goals remain appropriate within 4 weeks.    PLAN     Continue with current plan of care to progress strength, endurance, and mobility toward established therapy goals.  Progress periscap and postural strength as tolerated and perform manual and dry needling as needed.    Anusha Meraz, PT

## 2022-02-03 NOTE — PROGRESS NOTES
PT/PTA met face to face to discuss pt's treatment plan and progress towards established goals. Pt will be seen by a physical therapist minimally every 6th visit or every 30 days.      Anusha Meraz, PT

## 2022-02-04 ENCOUNTER — CLINICAL SUPPORT (OUTPATIENT)
Dept: REHABILITATION | Facility: HOSPITAL | Age: 47
End: 2022-02-04
Payer: OTHER GOVERNMENT

## 2022-02-04 DIAGNOSIS — G89.29 CHRONIC INTRACTABLE HEADACHE, UNSPECIFIED HEADACHE TYPE: ICD-10-CM

## 2022-02-04 DIAGNOSIS — R26.89 DECREASED FUNCTIONAL MOBILITY: ICD-10-CM

## 2022-02-04 DIAGNOSIS — M54.2 NECK PAIN: ICD-10-CM

## 2022-02-04 DIAGNOSIS — R51.9 CHRONIC INTRACTABLE HEADACHE, UNSPECIFIED HEADACHE TYPE: ICD-10-CM

## 2022-02-04 PROCEDURE — 97110 THERAPEUTIC EXERCISES: CPT | Mod: PN,CQ

## 2022-02-04 PROCEDURE — 97140 MANUAL THERAPY 1/> REGIONS: CPT | Mod: PN,CQ

## 2022-02-04 NOTE — PROGRESS NOTES
"[facility]   Physical Therapy Treatment Note     Name: Yonatan Allen  Clinic Number: [id number]    Therapy Diagnosis:   Encounter Diagnoses   Name Primary?    Neck pain     Chronic intractable headache, unspecified headache type     Decreased functional mobility      Physician: Camryn Webb NP    Visit Date: 2/4/2022    Physician Orders: PT Eval and Treat    Medical Diagnosis from Referral:   G43.719 (ICD-10-CM) - Intractable chronic migraine without aura and without status migrainosus   M79.18 (ICD-10-CM) - Cervical myofascial pain syndrome         Evaluation Date: 12/30/2021  Authorization Period Expiration: 6/30/22  Plan of Care Expiration: 2/25/22 (reassessed on 2/2/22)  Visit # / Visits authorized: 6/ 12 (+1 from eval)      Precautions: Standard    PTA Visit #: 1/5     Time In: 0806  Time Out: 0900  Total Billable Time: 54 minutes (1:1)    SUBJECTIVE     Pt reports: yesterday had minor headache 3/10 took excedrine before it got worse. Was driving about an hour. "I feel much much better than before. I don't have migraines like I used to."    He was somewhat compliant with home exercise program, did stretches but not lying down exerises.  Response to previous treatment: felt okay  Functional change: decreased frequency of headaches     Pain: 0/10 to neck  Location: bilateral tops of shoulders and into neck some      OBJECTIVE     Posture: slight rounded shoulders and fwd head    Treatment     YONATAN received the treatments listed below:      therapeutic exercises to develop strength, ROM, flexibility and posture for 44 minutes including:  Supine pec minor stretch over 1/2 roll 2 x 1 min  Supine pec major stretch over 1/2 roll 2 x 1 min  Supine scap retraction over 1/2 roll 2 x 10 x 3 sec hold  Seated nods x10, 5 sec hold   Seated cervical retraction x10, 5 sec hold  Prone over red tball and kneeling on foam sh ext x15, 3#  Prone over red tball and kneeling on foam horiz abd x 15, " "2#  (muscle fatigue noted)  Seated upper trap stretch 3x20 sec  Seated levator scap stretch 3x20 sec  Squat "W" row B tubing 2x10  Sh ext with B tubing x15  Scap clocks large Y sport loop x5 ea  Bruegger's RTB 20 x 3 sec  ABCs on wall for scap stab x 1  -not performed (time) Lower trap wall slides with lift off x 10 with 3 sec hold - onset of slight dizziness due to closeness of wall  Corner pec stretch 1x20 sec ea (one in each position of high, mid, and low)  Seated VOR gaze stabilization at 5 feet x 10 reps:    Nods, rotations (dizziness with rotation)    manual therapy techniques: Soft tissue Mobilization were applied to the: B UT, cervical paraspinals and suboccipitals for 10 minutes, including:  STM to R levator scap         Patient Education and Home Exercises     Home Exercises Provided and Patient Education Provided     Education provided:   - moist heat for decreased soreness after dry needling  Pt gave verbal understanding to all education provided    Written Home Exercises Provided: pt instructed to continue previous HEP. Exercises were reviewed and ROMARIO was able to demonstrate them prior to the end of the session.  ROMARIO demonstrated good  understanding of the education provided. See EMR under Patient Instructions in Notes section for exercises provided during therapy sessions    ASSESSMENT     Fatigued quickly with exercises while prone over ball, requiring increased manual cues for scapular stabilization and prevention of compensatory shoulder elevation. Dizziness reported after seated VOR head turns which resolved after stopping activity.   Patient will benefit from continued progression for postural strengthening as able.    ROMARIO Is progressing well towards his goals.   Pt prognosis is Good.     Pt will continue to benefit from skilled outpatient physical therapy to address the deficits listed in the problem list box on initial evaluation, provide pt/family education and to maximize pt's level of " independence in the home and community environment.     Pt's spiritual, cultural and educational needs considered and pt agreeable to plan of care and goals.     Anticipated barriers to physical therapy: work schedule    Goals:   Short Term Goals: 4 weeks   1.Report decreased neck and headache pain  <   / =  4  /10  to increase tolerance for ADLs (met)  2. Pt will report decreased headaches to 2x/week for increased tolerance for ADLs (met)  3. Increased R UE strength by 1/3 muscle grade in all deficient planes to increase tolerance for ADL and work activities. (Met)  4.  Increased L UE strength by 1/3 muscle grade in all deficient planes to increase tolerance for ADL and work activities. (met)  5. Pt to tolerate HEP to improve ROM and independence with ADL's (met)        Long Term Goals: 8 weeks (progressing, not met)  1.Report decreased neck/headache pain  <   / =  2  /10  to increase tolerance for ADLs  2.Pt will report decreased headaches to 1x/week for increased tolerance for ADLs  3.Increased R UE strength by 1 muscle grade in all deficient planes  to increase tolerance for ADL and work activities.  4. Increased L UE strength by 1 muscle grade in all deficient planes  to increase tolerance for ADL and work activities.  5. Pt to be Independent with HEP to improve ROM and independence with ADL's  6. Increased MMT  for lower traps/middle traps/rhomboids to > or = 4+/5 to increase tolerance for ADL and improve posture.    Unmet goals remain appropriate within 4 weeks.    PLAN     Continue with current plan of care to progress strength, endurance, and mobility toward established therapy goals.  Progress periscap and postural strength as tolerated and perform manual and dry needling as needed.    Amrita Palacios, PTA

## 2022-02-08 ENCOUNTER — CLINICAL SUPPORT (OUTPATIENT)
Dept: REHABILITATION | Facility: HOSPITAL | Age: 47
End: 2022-02-08
Payer: OTHER GOVERNMENT

## 2022-02-08 DIAGNOSIS — M54.2 NECK PAIN: ICD-10-CM

## 2022-02-08 DIAGNOSIS — G89.29 CHRONIC INTRACTABLE HEADACHE, UNSPECIFIED HEADACHE TYPE: ICD-10-CM

## 2022-02-08 DIAGNOSIS — R26.89 DECREASED FUNCTIONAL MOBILITY: ICD-10-CM

## 2022-02-08 DIAGNOSIS — R51.9 CHRONIC INTRACTABLE HEADACHE, UNSPECIFIED HEADACHE TYPE: ICD-10-CM

## 2022-02-08 PROCEDURE — 97110 THERAPEUTIC EXERCISES: CPT | Mod: PN,CQ

## 2022-02-08 PROCEDURE — 97140 MANUAL THERAPY 1/> REGIONS: CPT | Mod: PN,CQ

## 2022-02-08 NOTE — PROGRESS NOTES
[facility]   Physical Therapy Treatment Note     Name: Yonatan Allen  Clinic Number: [id number]    Therapy Diagnosis:   Encounter Diagnoses   Name Primary?    Neck pain     Chronic intractable headache, unspecified headache type     Decreased functional mobility      Physician: Camryn Webb NP    Visit Date: 2/8/2022    Physician Orders: PT Eval and Treat    Medical Diagnosis from Referral:   G43.719 (ICD-10-CM) - Intractable chronic migraine without aura and without status migrainosus   M79.18 (ICD-10-CM) - Cervical myofascial pain syndrome         Evaluation Date: 12/30/2021  Authorization Period Expiration: 6/30/22  Plan of Care Expiration: 2/25/22 (reassessed on 2/2/22)  Visit # / Visits authorized: 8/ 12 (+1 from eval)      Precautions: Standard    PTA Visit #: 2/5     Time In: 3:04  Time Out: 3:53  Total Billable Time: 48 minutes (1:1)    SUBJECTIVE     Pt reports: he has been feeling significantly better over the last week, with less pain to the neck and no headaches. States he has noticed occasionally getting dizzy when walking, unsure what it could be caused from.     He was somewhat compliant with home exercise program, did stretches but not lying down exerises.  Response to previous treatment: felt okay  Functional change: decreased frequency of headaches     Pain: 0/10 to neck  Location: bilateral tops of shoulders and into neck some      OBJECTIVE     Posture: slight rounded shoulders and fwd head    Treatment     YONATAN received the treatments listed below:      therapeutic exercises to develop strength, ROM, flexibility and posture for 38 minutes including:  Supine pec minor stretch over 1/2 roll 2 x 1 min  Supine pec major stretch over 1/2 roll 2 x 1 min  Supine scap retraction over 1/2 roll 2 x 10 x 3 sec hold  Seated nods x10, 5 sec hold   Seated cervical retraction x10, 5 sec hold  Prone over red tball and kneeling on foam sh ext x15, 3#  Prone over red tball and  "kneeling on foam horiz abd x 15, 2#  (muscle fatigue noted)  Seated upper trap stretch 3x20 sec  Seated levator scap stretch 3x20 sec  Squat "W" row B tubing 2x10  Sh ext with B tubing x15  Scap clocks large Y sport loop x5 ea  Bruegger's RTB 20 x 3 sec  ABCs on wall for scap stab x 1  Lower trap wall slides with lift off x 10 with 3 sec hold   Corner pec stretch 1x20 sec ea (one in each position of high, mid, and low)  Seated VOR gaze stabilization at 5 feet x 10 reps: Nods, rotations (dizziness on L with rotation toward R)    manual therapy techniques: Soft tissue Mobilization were applied to the: B UT, cervical paraspinals and suboccipitals for 10 minutes, including:  STM to R levator scap         Patient Education and Home Exercises     Home Exercises Provided and Patient Education Provided     Education provided:   - moist heat for decreased soreness after dry needling  Pt gave verbal understanding to all education provided    Written Home Exercises Provided: pt instructed to continue previous HEP. Exercises were reviewed and ROMARIO was able to demonstrate them prior to the end of the session.  ROMARIO demonstrated good  understanding of the education provided. See EMR under Patient Instructions in Notes section for exercises provided during therapy sessions    ASSESSMENT     Patient with onset of dizziness with VOR rotations today, noted in the L eye when rotating toward the right if going too far and improves with decreased ROM. Relief of dizziness with 5-10 seconds of rest. Improving endurance with prone over ball exercises, less cues needed for scapular positioning. Increasing strength and endurance with wall exercises as well. Upon further discussion, patient realized dizziness with walking at work may be occurring due to looking at people or objects out of his periphery. Patient will benefit from continued progression for postural strengthening as able.    ROMARIO Is progressing well towards his goals.   Pt " prognosis is Good.     Pt will continue to benefit from skilled outpatient physical therapy to address the deficits listed in the problem list box on initial evaluation, provide pt/family education and to maximize pt's level of independence in the home and community environment.     Pt's spiritual, cultural and educational needs considered and pt agreeable to plan of care and goals.     Anticipated barriers to physical therapy: work schedule    Goals:   Short Term Goals: 4 weeks   1.Report decreased neck and headache pain  <   / =  4  /10  to increase tolerance for ADLs (met)  2. Pt will report decreased headaches to 2x/week for increased tolerance for ADLs (met)  3. Increased R UE strength by 1/3 muscle grade in all deficient planes to increase tolerance for ADL and work activities. (Met)  4.  Increased L UE strength by 1/3 muscle grade in all deficient planes to increase tolerance for ADL and work activities. (met)  5. Pt to tolerate HEP to improve ROM and independence with ADL's (met)        Long Term Goals: 8 weeks (progressing, not met)  1.Report decreased neck/headache pain  <   / =  2  /10  to increase tolerance for ADLs  2.Pt will report decreased headaches to 1x/week for increased tolerance for ADLs  3.Increased R UE strength by 1 muscle grade in all deficient planes  to increase tolerance for ADL and work activities.  4. Increased L UE strength by 1 muscle grade in all deficient planes  to increase tolerance for ADL and work activities.  5. Pt to be Independent with HEP to improve ROM and independence with ADL's  6. Increased MMT  for lower traps/middle traps/rhomboids to > or = 4+/5 to increase tolerance for ADL and improve posture.    Unmet goals remain appropriate within 4 weeks.    PLAN     Continue with current plan of care to progress strength, endurance, and mobility toward established therapy goals.  Progress periscap and postural strength as tolerated and perform manual and dry needling as  needed.    Karolina Schuster, KIRBY

## 2022-02-10 ENCOUNTER — CLINICAL SUPPORT (OUTPATIENT)
Dept: REHABILITATION | Facility: HOSPITAL | Age: 47
End: 2022-02-10
Payer: OTHER GOVERNMENT

## 2022-02-10 DIAGNOSIS — M54.2 NECK PAIN: ICD-10-CM

## 2022-02-10 DIAGNOSIS — R51.9 CHRONIC INTRACTABLE HEADACHE, UNSPECIFIED HEADACHE TYPE: ICD-10-CM

## 2022-02-10 DIAGNOSIS — G89.29 CHRONIC INTRACTABLE HEADACHE, UNSPECIFIED HEADACHE TYPE: ICD-10-CM

## 2022-02-10 DIAGNOSIS — R26.89 DECREASED FUNCTIONAL MOBILITY: ICD-10-CM

## 2022-02-10 PROCEDURE — 97110 THERAPEUTIC EXERCISES: CPT | Mod: PN,CQ

## 2022-02-10 PROCEDURE — 97140 MANUAL THERAPY 1/> REGIONS: CPT | Mod: PN,CQ

## 2022-02-10 NOTE — PROGRESS NOTES
[facility]   Physical Therapy Treatment Note     Name: Yonatan Allen  Clinic Number: [id number]    Therapy Diagnosis:   Encounter Diagnoses   Name Primary?    Neck pain     Chronic intractable headache, unspecified headache type     Decreased functional mobility      Physician: Camryn Webb NP    Visit Date: 2/10/2022    Physician Orders: PT Eval and Treat    Medical Diagnosis from Referral:   G43.719 (ICD-10-CM) - Intractable chronic migraine without aura and without status migrainosus   M79.18 (ICD-10-CM) - Cervical myofascial pain syndrome         Evaluation Date: 12/30/2021  Authorization Period Expiration: 6/30/22  Plan of Care Expiration: 2/25/22 (reassessed on 2/2/22)  Visit # / Visits authorized: 8/ 12 (+1 from eval)      Precautions: Standard    PTA Visit #: 3/5     Time In: 3:08  Time Out: 4:03  Total Billable Time: 55 minutes (1:1)    SUBJECTIVE     Pt reports: some tightness along the suboccipitals and back of the skull today, been going on for about an hour. Had one instance where he turned his head quickly while sitting which caused a dizzy spell, recovered within a minute but did notice the tightness shortly after that.     He was somewhat compliant with home exercise program, did stretches but not lying down exerises.  Response to previous treatment: felt okay  Functional change: decreased frequency of headaches     Pain: 1/10 to neck  Location: bilateral tops of shoulders and into neck some      OBJECTIVE     Posture: slight rounded shoulders and fwd head    Treatment     YONATAN received the treatments listed below:      therapeutic exercises to develop strength, ROM, flexibility and posture for 45 minutes including:  Supine pec minor stretch over 1/2 roll 2 x 1 min  Supine pec major stretch over 1/2 roll x 2 min  Seated scap retraction over towel roll 2 x 10 x 3 sec hold  Seated cervical nods x10, 5 sec hold   Seated cervical retraction x10, 5 sec hold  Seated upper  "trap stretch 3x20 sec  Seated levator scap stretch 3x20 sec  Prone over red tball and kneeling on foam sh ext 2x10, 3#  Prone over red tball and kneeling on foam horiz abd 2x10, 2#  (muscle fatigue noted)  Squat "W" row B tubing 2x10  Sh ext with B tubing 2x10  Scap clocks large Y sport loop 2x5 ea  Bruegger's RTB 20 x 3 sec  ABCs on wall for scap stab x 1 (1# weight wrapped in towel)  Lower trap wall slides with lift off x 15 with 3 sec hold   Corner pec stretch 1x20 sec ea (one in each position of high, mid, and low)  Seated VOR gaze stabilization at 5 feet x 10 reps: Nods, rotations (dizziness on L with rotation toward R)    manual therapy techniques: Soft tissue Mobilization were applied to the: B UT, cervical paraspinals and suboccipitals for 10 minutes, including:  STM to R levator scap, suboccipital release         Patient Education and Home Exercises     Home Exercises Provided and Patient Education Provided     Education provided:   - moist heat for decreased soreness after dry needling  Pt gave verbal understanding to all education provided    Written Home Exercises Provided: pt instructed to continue previous HEP. Exercises were reviewed and ROMARIO was able to demonstrate them prior to the end of the session.  ROMARIO demonstrated good  understanding of the education provided. See EMR under Patient Instructions in Notes section for exercises provided during therapy sessions    ASSESSMENT     Patient with reduction of tightness along suboccipitals following manual techniques however continues with mild tightness along L levator scap throughout session. He is improving with periscapular strength and was able to progress reps of several exercises today. Very slight onset of dizziness with VOR rotations to the right again today, but recovered immediately with rest.    Patient will benefit from continued progression for postural strengthening as able.    ROMARIO Is progressing well towards his goals.   Pt prognosis is " Good.     Pt will continue to benefit from skilled outpatient physical therapy to address the deficits listed in the problem list box on initial evaluation, provide pt/family education and to maximize pt's level of independence in the home and community environment.     Pt's spiritual, cultural and educational needs considered and pt agreeable to plan of care and goals.     Anticipated barriers to physical therapy: work schedule    Goals:   Short Term Goals: 4 weeks   1.Report decreased neck and headache pain  <   / =  4  /10  to increase tolerance for ADLs (met)  2. Pt will report decreased headaches to 2x/week for increased tolerance for ADLs (met)  3. Increased R UE strength by 1/3 muscle grade in all deficient planes to increase tolerance for ADL and work activities. (Met)  4.  Increased L UE strength by 1/3 muscle grade in all deficient planes to increase tolerance for ADL and work activities. (met)  5. Pt to tolerate HEP to improve ROM and independence with ADL's (met)        Long Term Goals: 8 weeks (progressing, not met)  1.Report decreased neck/headache pain  <   / =  2  /10  to increase tolerance for ADLs  2.Pt will report decreased headaches to 1x/week for increased tolerance for ADLs  3.Increased R UE strength by 1 muscle grade in all deficient planes  to increase tolerance for ADL and work activities.  4. Increased L UE strength by 1 muscle grade in all deficient planes  to increase tolerance for ADL and work activities.  5. Pt to be Independent with HEP to improve ROM and independence with ADL's  6. Increased MMT  for lower traps/middle traps/rhomboids to > or = 4+/5 to increase tolerance for ADL and improve posture.    Unmet goals remain appropriate within 4 weeks.    PLAN     Continue with current plan of care to progress strength, endurance, and mobility toward established therapy goals.  Progress periscap and postural strength as tolerated and perform manual and dry needling as needed.    Karolina  Mariely, PTA

## 2022-02-14 ENCOUNTER — CLINICAL SUPPORT (OUTPATIENT)
Dept: REHABILITATION | Facility: HOSPITAL | Age: 47
End: 2022-02-14
Payer: OTHER GOVERNMENT

## 2022-02-14 DIAGNOSIS — R51.9 CHRONIC INTRACTABLE HEADACHE, UNSPECIFIED HEADACHE TYPE: ICD-10-CM

## 2022-02-14 DIAGNOSIS — R26.89 DECREASED FUNCTIONAL MOBILITY: ICD-10-CM

## 2022-02-14 DIAGNOSIS — M54.2 NECK PAIN: ICD-10-CM

## 2022-02-14 DIAGNOSIS — G89.29 CHRONIC INTRACTABLE HEADACHE, UNSPECIFIED HEADACHE TYPE: ICD-10-CM

## 2022-02-14 PROCEDURE — 97140 MANUAL THERAPY 1/> REGIONS: CPT | Mod: PN,CQ

## 2022-02-14 PROCEDURE — 97110 THERAPEUTIC EXERCISES: CPT | Mod: PN,CQ

## 2022-02-14 NOTE — PROGRESS NOTES
[facility]   Physical Therapy Treatment Note     Name: Yonatan Allen  Clinic Number: [id number]    Therapy Diagnosis:   Encounter Diagnoses   Name Primary?    Neck pain     Chronic intractable headache, unspecified headache type     Decreased functional mobility      Physician: Camryn Webb NP    Visit Date: 2/14/2022    Physician Orders: PT Eval and Treat    Medical Diagnosis from Referral:   G43.719 (ICD-10-CM) - Intractable chronic migraine without aura and without status migrainosus   M79.18 (ICD-10-CM) - Cervical myofascial pain syndrome         Evaluation Date: 12/30/2021  Authorization Period Expiration: 6/30/22  Plan of Care Expiration: 2/25/22 (reassessed on 2/2/22)  Visit # / Visits authorized: 10/ 12 (+1 from eval)      Precautions: Standard    PTA Visit #: 4/5     Time In: 5:01  Time Out: 5:46  Total Billable Time: 45 minutes (1:1)    SUBJECTIVE     Pt reports: he has not had anything to eat today because he has been too busy. No onset of dizziness today, but did have one instance this past Saturday. Currently having a headache above the ears, but could be related to hunger    He was somewhat compliant with home exercise program, did stretches but not lying down exerises.  Response to previous treatment: felt okay  Functional change: decreased frequency of headaches     Pain: 1/10 to neck  Location: bilateral tops of shoulders and into neck some      OBJECTIVE     Posture: slight rounded shoulders and fwd head    Treatment     YONATAN received the treatments listed below:      therapeutic exercises to develop strength, ROM, flexibility and posture for 35 minutes including:  Supine pec minor stretch over 1/2 roll x 2 min  Supine pec major stretch over 1/2 roll x 2 min  Seated scap retraction over towel roll 2 x 10 x 3 sec hold  Seated cervical nods x10, 5 sec hold   Seated cervical retraction x10, 5 sec hold  Seated upper trap stretch 3x20 sec  Seated levator scap stretch  "3x20 sec  Prone over red tball and kneeling on foam sh ext 2x10, 3#  Prone over red tball and kneeling on foam horiz abd 2x10, 2#  (muscle fatigue noted)  Squat "W" row B tubing 2x10  Sh ext with B tubing 2x10  Scap clocks large Y sport loop 2x5 ea  ABCs on wall for scap stab x 1 (1# weight wrapped in towel)   Suboccipital release with tennis balls, gentle cervical nods x 2 min  Held remaining exercises:  Lower trap wall slides with lift off x 15 with 3 sec hold   Bruegger's GTB 20 x 3 sec  Corner pec stretch 1x20 sec ea (one in each position of high, mid, and low)  Seated VOR gaze stabilization at 5 feet x 10 reps: Nods, rotations (dizziness on L with rotation toward R)    Possible for next time: Pushup + from treadmill bar, high plank,       manual therapy techniques: Soft tissue Mobilization were applied to the: B UT, cervical paraspinals and suboccipitals for 10 minutes, including:  STM to R levator scap, suboccipital release         Patient Education and Home Exercises     Home Exercises Provided and Patient Education Provided     Education provided:   - moist heat for decreased soreness after dry needling  Pt gave verbal understanding to all education provided    Written Home Exercises Provided: pt instructed to continue previous HEP. Exercises were reviewed and YONATAN was able to demonstrate them prior to the end of the session.  YONATAN demonstrated good  understanding of the education provided. See EMR under Patient Instructions in Notes section for exercises provided during therapy sessions    ASSESSMENT     Yonatan presents with mild suboccipital tightness again which lessened with manual techniques and eliminated headache. Headache returned toward end of session with scap clocks on wall, and eliminated with gentle nods with tennis balls at suboccipitals. Session stopped at this point due to patient having returned headache and also having not eaten anything all day. Will resume held exercises next session as " able.    Patient will benefit from continued progression for postural strengthening as able.    ROMARIO Is progressing well towards his goals.   Pt prognosis is Good.     Pt will continue to benefit from skilled outpatient physical therapy to address the deficits listed in the problem list box on initial evaluation, provide pt/family education and to maximize pt's level of independence in the home and community environment.     Pt's spiritual, cultural and educational needs considered and pt agreeable to plan of care and goals.     Anticipated barriers to physical therapy: work schedule    Goals:   Short Term Goals: 4 weeks   1.Report decreased neck and headache pain  <   / =  4  /10  to increase tolerance for ADLs (met)  2. Pt will report decreased headaches to 2x/week for increased tolerance for ADLs (met)  3. Increased R UE strength by 1/3 muscle grade in all deficient planes to increase tolerance for ADL and work activities. (Met)  4.  Increased L UE strength by 1/3 muscle grade in all deficient planes to increase tolerance for ADL and work activities. (met)  5. Pt to tolerate HEP to improve ROM and independence with ADL's (met)        Long Term Goals: 8 weeks (progressing, not met)  1.Report decreased neck/headache pain  <   / =  2  /10  to increase tolerance for ADLs  2.Pt will report decreased headaches to 1x/week for increased tolerance for ADLs  3.Increased R UE strength by 1 muscle grade in all deficient planes  to increase tolerance for ADL and work activities.  4. Increased L UE strength by 1 muscle grade in all deficient planes  to increase tolerance for ADL and work activities.  5. Pt to be Independent with HEP to improve ROM and independence with ADL's  6. Increased MMT  for lower traps/middle traps/rhomboids to > or = 4+/5 to increase tolerance for ADL and improve posture.    Unmet goals remain appropriate within 4 weeks.    PLAN     Continue with current plan of care to progress strength, endurance,  and mobility toward established therapy goals.  Progress periscap and postural strength as tolerated and perform manual and dry needling as needed.    Karolina Schuster, PTA

## 2022-02-16 ENCOUNTER — CLINICAL SUPPORT (OUTPATIENT)
Dept: REHABILITATION | Facility: HOSPITAL | Age: 47
End: 2022-02-16
Payer: OTHER GOVERNMENT

## 2022-02-16 ENCOUNTER — DOCUMENTATION ONLY (OUTPATIENT)
Dept: REHABILITATION | Facility: HOSPITAL | Age: 47
End: 2022-02-16
Payer: OTHER GOVERNMENT

## 2022-02-16 DIAGNOSIS — M54.2 NECK PAIN: ICD-10-CM

## 2022-02-16 DIAGNOSIS — G89.29 CHRONIC INTRACTABLE HEADACHE, UNSPECIFIED HEADACHE TYPE: ICD-10-CM

## 2022-02-16 DIAGNOSIS — R51.9 CHRONIC INTRACTABLE HEADACHE, UNSPECIFIED HEADACHE TYPE: ICD-10-CM

## 2022-02-16 DIAGNOSIS — R26.89 DECREASED FUNCTIONAL MOBILITY: ICD-10-CM

## 2022-02-16 PROCEDURE — 97110 THERAPEUTIC EXERCISES: CPT | Mod: PN

## 2022-02-17 NOTE — PROGRESS NOTES
PT/PTA met face to face to discuss pt's treatment plan and progress towards established goals. Pt will be seen by a physical therapist minimally every 6th visit or every 30 days.    Please see Updated Plan of Care dated 2/16/22 for changes and updated goals.    Anusha Meraz, PT

## 2022-02-17 NOTE — PLAN OF CARE
[facility]   Updated Plan of Care and Physical Therapy Treatment Note     Name: Yonatan Allen  Clinic Number: [id number]    Therapy Diagnosis:   Encounter Diagnoses   Name Primary?    Neck pain     Chronic intractable headache, unspecified headache type     Decreased functional mobility      Physician: Camryn Webb NP    Visit Date: 2/16/2022    Physician Orders: PT Eval and Treat    Medical Diagnosis from Referral:   G43.719 (ICD-10-CM) - Intractable chronic migraine without aura and without status migrainosus   M79.18 (ICD-10-CM) - Cervical myofascial pain syndrome         Evaluation Date: 12/30/2021  Authorization Period Expiration: 6/30/22  Plan of Care Expiration: 3/18/22 (reassessed on 2/16/22)  Visit # / Visits authorized: 11/ 15 (+1 from eval)      Precautions: Standard    PTA Visit #: 4/5     Time In: 4:00  Time Out: 4:50  Total Billable Time: 50 minutes     SUBJECTIVE     Pt reports: he did not have a headache today.  He had a HA yesterday, which lasted an hour and went away on its own.  He states he's had 3-4 headaches in the last week.  He states everything is getting better.  He states his headaches have been 2-3/10 pain for the most part and 6/10 at its worst.  He states he occasionally has stiffness in neck with the headaches.  He was somewhat compliant with home exercise program, did stretches but not lying down exerises.  Response to previous treatment: felt okay  Functional change: decreased frequency of headaches     Pain: 1/10 to neck  Location: bilateral tops of shoulders and into neck some      OBJECTIVE     Posture: slight rounded shoulders and fwd head    Cervical Range of Motion:    Degrees Pain   Flexion 58 no     Extension 50 no     Right Rotation 63 no     Left Rotation 68 no     Right Side Bending 50 no   Left Side Bending 50 no      Shoulder Range of Motion:   WFL B, but tightness with ER and IR B    Strength:  Cervical MMT   Flexion 5/5    Extension  "5/5   Right Side Bend 5/5   Left Side Bend 5/5     Upper Extremity Strength  (R) UE  (L) UE    Shoulder flexion: 5/5 Shoulder flexion: 5/5   Shoulder Abduction: 5/5 Shoulder abduction: 5/5   Shoulder ER 5/5 Shoulder ER 5/5   Shoulder IR 5/5 Shoulder IR 5/5   Lower Trap 4+/5 Lower Trap 4+/5   Middle Trap 5/5 Middle Trap 5/5   Rhomboids 5/5 Rhomboids 5/5       Special Tests:  Sharp-Josiane -   Lateral Flexion Alar Ligament intact       Palpation: no TTP or tightness to UT, levator scap.  Slight tightness to suboccipitals today     Sensation: grossly intact to light touch        Treatment     ROMARIO received the treatments listed below:      therapeutic exercises to develop strength, ROM, flexibility and posture for 45 minutes including:  Reassessment  Supine pec minor stretch over 1/2 roll x 2 min  Supine pec major stretch over 1/2 roll x 2 min  Seated cervical nods x10, 5 sec hold   Seated cervical retraction x10, 5 sec hold  NP Seated upper trap stretch 3x20 sec  NP Seated levator scap stretch 3x20 sec  Prone over red tball and kneeling on foam sh ext 2x10, 3#  Prone over red tball and kneeling on foam horiz abd 2x10, 2#  (muscle fatigue noted)  Squat "W" row B tubing 2x15  Sh ext with B tubing 2x15  Scap clocks large Y sport loop 2x5 ea  ABCs on wall for scap stab x 1 (with red plyo ball)  Push up plus on TM bar 2x10   Suboccipital release with tennis balls, gentle cervical nods x 2 min  Held remaining exercises:  Lower trap wall slides with lift off x 15 with 3 sec hold   Bruegger's GTB 20 x 3 sec  Corner pec stretch 1x20 sec ea (one in each position of high, mid, and low)  Seated VOR gaze stabilization at 5 feet x 10 reps: Nods, rotations (dizziness on L with rotation toward R)    Possible for next time: high plank       manual therapy techniques: Soft tissue Mobilization were applied to the: B UT, cervical paraspinals and suboccipitals for 5 minutes, including:  suboccipital release         Patient Education and " Home Exercises     Home Exercises Provided and Patient Education Provided     Education provided:   - moist heat for decreased soreness after dry needling  Pt gave verbal understanding to all education provided    Written Home Exercises Provided: pt instructed to continue previous HEP. Exercises were reviewed and YONATAN was able to demonstrate them prior to the end of the session.  YONATAN demonstrated good  understanding of the education provided. See EMR under Patient Instructions in Notes section for exercises provided during therapy sessions    ASSESSMENT     Yonatan reassessed today.  He has improved with increased cervical ROM, decreased frequency and intensity of headaches, and increased periscap strength.  He missed 1 week due to covid and will benefit from a few more visits to ensure patient is able to manage symptoms independently. Patient will benefit from continued progression for postural strengthening as able.    YONATAN Is progressing well towards his goals.   Pt prognosis is Good.     Pt will continue to benefit from skilled outpatient physical therapy to address the deficits listed in the problem list box on initial evaluation, provide pt/family education and to maximize pt's level of independence in the home and community environment.     Pt's spiritual, cultural and educational needs considered and pt agreeable to plan of care and goals.     Anticipated barriers to physical therapy: work schedule    Goals:   Short Term Goals: 4 weeks   1.Report decreased neck and headache pain  <   / =  4  /10  to increase tolerance for ADLs (met)  2. Pt will report decreased headaches to 2x/week for increased tolerance for ADLs (met)  3. Increased R UE strength by 1/3 muscle grade in all deficient planes to increase tolerance for ADL and work activities. (Met)  4.  Increased L UE strength by 1/3 muscle grade in all deficient planes to increase tolerance for ADL and work activities. (met)  5. Pt to tolerate HEP to improve  ROM and independence with ADL's (met)        Long Term Goals: 8 weeks   1.Report decreased neck/headache pain  <   / =  2  /10  to increase tolerance for ADLs (progressing)  2.Pt will report decreased headaches to 1x/week for increased tolerance for ADLs (progressing)  3.Increased R UE strength by 1 muscle grade in all deficient planes  to increase tolerance for ADL and work activities. (met)  4. Increased L UE strength by 1 muscle grade in all deficient planes  to increase tolerance for ADL and work activities. (met)  5. Pt to be Independent with HEP to improve ROM and independence with ADL's (met)  6. Increased MMT  for lower traps/middle traps/rhomboids to > or = 4+/5 to increase tolerance for ADL and improve posture. (met)    Unmet goals remain appropriate within 4 weeks.    PLAN     Plan of care Certification: 2/16/22 to 3/18/22.    Outpatient Physical Therapy 1 times weekly for 4 weeks to include the following interventions: Electrical Stimulation  , Manual Therapy, Moist Heat/ Ice, Neuromuscular Re-ed, Patient Education, Self Care, Therapeutic Activities, Therapeutic Exercise and dry needling.     Anusha Meraz, PT

## 2022-02-21 ENCOUNTER — TELEPHONE (OUTPATIENT)
Dept: PHARMACY | Facility: CLINIC | Age: 47
End: 2022-02-21
Payer: OTHER GOVERNMENT

## 2022-02-21 ENCOUNTER — OFFICE VISIT (OUTPATIENT)
Dept: NEUROLOGY | Facility: CLINIC | Age: 47
End: 2022-02-21
Payer: OTHER GOVERNMENT

## 2022-02-21 VITALS
WEIGHT: 235.56 LBS | BODY MASS INDEX: 32.98 KG/M2 | SYSTOLIC BLOOD PRESSURE: 128 MMHG | HEIGHT: 71 IN | HEART RATE: 57 BPM | DIASTOLIC BLOOD PRESSURE: 82 MMHG | RESPIRATION RATE: 17 BRPM | TEMPERATURE: 98 F

## 2022-02-21 DIAGNOSIS — G43.719 INTRACTABLE CHRONIC MIGRAINE WITHOUT AURA AND WITHOUT STATUS MIGRAINOSUS: Primary | ICD-10-CM

## 2022-02-21 DIAGNOSIS — M79.18 CERVICAL MYOFASCIAL PAIN SYNDROME: ICD-10-CM

## 2022-02-21 PROCEDURE — 99999 PR PBB SHADOW E&M-EST. PATIENT-LVL IV: CPT | Mod: PBBFAC,,, | Performed by: NURSE PRACTITIONER

## 2022-02-21 PROCEDURE — 99213 PR OFFICE/OUTPT VISIT, EST, LEVL III, 20-29 MIN: ICD-10-PCS | Mod: S$PBB,,, | Performed by: NURSE PRACTITIONER

## 2022-02-21 PROCEDURE — 99213 OFFICE O/P EST LOW 20 MIN: CPT | Mod: S$PBB,,, | Performed by: NURSE PRACTITIONER

## 2022-02-21 PROCEDURE — 99214 OFFICE O/P EST MOD 30 MIN: CPT | Mod: PBBFAC,PO | Performed by: NURSE PRACTITIONER

## 2022-02-21 PROCEDURE — 99999 PR PBB SHADOW E&M-EST. PATIENT-LVL IV: ICD-10-PCS | Mod: PBBFAC,,, | Performed by: NURSE PRACTITIONER

## 2022-02-21 RX ORDER — RIMEGEPANT SULFATE 75 MG/75MG
75 TABLET, ORALLY DISINTEGRATING ORAL DAILY PRN
Qty: 8 TABLET | Refills: 11 | Status: SHIPPED | OUTPATIENT
Start: 2022-02-21

## 2022-02-21 NOTE — PATIENT INSTRUCTIONS
Please call our clinic at [phone] or send a message on the [facility] portal if there are any changes to the plan described below, for example,if you are not contacted for the requested tests, referral(s) within one week, if you are unable to receive the medications prescribed, or if you feel you need to change the treatment course for any reason.     TESTING:  -- none at this time    REFERRALS:  -- none     PREVENTION (use daily regardless of headache):  -- continue Botox     AS-NEEDED TREATMENT (use total no more than 10 days per month unless otherwise stated):  - START Nurtec with next migraine. This dissolves under the tongue and is only taken once in 24 hour time frame.  -- STOP rizatriptan   -- continue tizanidine at night. This is a muscle relaxer and it will also make you potentially sleepy. Start with half a tablet to see how you respond but can take up to a whole tablet if needed

## 2022-02-21 NOTE — ASSESSMENT & PLAN NOTE
He is six weeks s/p first Botox. He is already having headache free days and less severe headaches. Rizatriptan effective when needed however, he has chest pains. Will change to Nurtec.

## 2022-02-21 NOTE — PROGRESS NOTES
Date of service: 2/21/2022  Referring provider: No ref. provider found    Subjective:      Chief complaint: Headache       Patient ID: Yonatan Allen is a 46 y.o. male with who presents for follow up of headache    History of Present Illness    INTERVAL HISTORY 2/21/22    Last visit was about two months and at that time plan was to start Botox for chronic migraine. He was also referred for sleep evaluation and PT. Added rizatriptan for acute escalations. First Botox was 1/10/22.    Today he reports he is much better. He reports less headaches. When present, headaches are sides and front. Current pain 3 with range 1-9. He has 3-4 headache days per week. He takes rizatriptan and tylenol 1-2 days per week. He is doing PT at the [facility] twice per week which has been very helpful. No side effects to Botox noted.   Otherwise information below is reviewed and verified with no changes made unless noted.    ORIGINAL HEADACHE HISTORY - 12/15/21  Age at onset and course over time: about 4 years ago began with migraine type headaches. Frequency has increased over time.   He has been diagnosed with TBI  Family history - mom and sister with migraines  Last eye exam - 7-8 months ago  Location: sides, front  Quality:  [x] pressure [] tight [x] throbbing [] sharp [x] stabbing   Severity: current 4 with range 6-10  Duration: hours to constant  Frequency: daily, rare headache free days  Headaches awaken at night?: yes, 1-2 nights per week   Worst time of day: mid-day, evening  Associated with: [x] photophobia []  phonophobia [] osmophobia [] blurred vision  [] double vision [] loss of appetite [x] nausea [] vomiting [x] dizziness [x] vertigo  [] tinnitus [x] irritability [] sinus pressure [] problems with concentration   [x] neck tightness   Alleviated by:  [] sleep [x] darkness [] massage [] heat [x] ice [x] medication  Exacerbated by:  [] fatigue [x] light [] noise [] smells [] coughing [] sneezing  [] bending over []  ovulation [] menses [] alcohol [] change in weather []  stress  Ipsilateral autonomic: [] nasal congestion [] lacrimation [] ptosis [] injection [] edema [] foreign body sensation [] ear fullness   ICP:  [x] transient visual obscurations - if he stands too quickly, he gets tunnel vision occasionally  [] tinnitus   [] positional headache  [x] non-positional   Sleep habits: sleep apnea, wears CPAP  Caffeine intake: 1-2 cups per day  Gyn status (if female): n/a    Current acute treatment:  Rizatriptan  Tizanidine     Current prevention:  Amitriptyline - on a couple years ago  Botox - first 1/10/22    Previously tried/failed acute treatment:  Tylenol  ASA  Naproxen  Sumatriptan    Previously tried/failed preventative treatment:  None  Blood pressure and heart rate will not tolerate antihypertensive  Medications must be weight neutral and 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      metFORMIN (GLUCOPHAGE-XR) 500 MG ER 24hr tablet Take 500 mg by mouth daily with breakfast.      omeprazole (PRILOSEC) 20 MG capsule Take 20 mg by mouth once daily.      rimegepant (NURTEC) 75 mg odt Take 1 tablet (75 mg total) by mouth daily as needed for Migraine. Place ODT tablet on the tongue; alternatively the ODT tablet may be placed under the tongue 8 tablet 11    rosuvastatin (CRESTOR) 20 MG tablet Take 20 mg by mouth once daily.      tiZANidine (ZANAFLEX) 4 MG tablet Half or full tablet by mouth at night as needed for muscle spasm 30 tablet 11    triamcinolone acetonide 0.1% (KENALOG) 0.1 % cream        Current Facility-Administered Medications   Medication Dose Route Frequency Provider Last Rate Last Admin    onabotulinumtoxina injection 200 Units  200 Units Intramuscular q12 weeks Camryn Webb,  NP   200 Units at 01/10/22 0903       Past Medical History  Past Medical History:   Diagnosis Date    Headache        Past Surgical History  History reviewed. No pertinent surgical history.    Family History  History reviewed. No pertinent family history.    Social History  Social History     Socioeconomic History    Marital status:    Tobacco Use    Smoking status: Current Some Day Smoker     Types: Cigars    Smokeless tobacco: Never Used        Review of Systems  14-point review of systems as follows:   No check honorio indicates NEGATIVE response   Constitutional: [] weight loss, [] change to appetite   Eyes: [] change in vision, [] double vision   Ears, nose, mouth, throat: [] frequent nose bleeds, [] ringing in the ears   Respiratory: [] cough, [] wheezing   Cardiovascular: [x] chest pain, [] palpitations   Gastrointestinal: [] jaundice, [] nausea/vomiting   Genitourinary: [] incontinence, [] burning with urination   Hematologic/lymphatic: [] easy bruising/bleeding, [] night sweats   Neurological: [] numbness, [] weakness   Endocrine: [] fatigue, [] heat/cold intolerance   Allergy/Immunologic: [] fevers, [] chills   Musculoskeletal: [] muscle pain, [] joint pain   Psychiatric: [] thoughts of harming self/others, [] depression   Integumentary: [] rashes, [] sores that do not heal     Objective:        Vitals:    02/21/22 0909   BP: 128/82   Pulse: (!) 57   Resp: 17   Temp: 97.8 °F (36.6 °C)     Body mass index is 32.85 kg/m².    2/21/22  Constitutional:   He appears well-developed and well-nourished. He is well groomed     Neurological Exam:  General: well-developed, well-nourished, no distress  Mental status: Awake and alert  Speech language: No dysarthria or aphasia on conversation  Cranial nerves: Face symmetric  Motor: Moves all extremities well  Coordination: No ataxia. No tremor.    12/15/21  Constitutional: appears in no acute distress, well-developed, well-nourished     Eyes: normal conjunctiva,  PERRLA    Ears, nose, mouth, throat: external appearance of ears and nose normal, hearing intact     Cardiovascular: regular rate and rhythm, no murmurs appreciated    Respiratory: unlabored respirations, breath sounds normal bilaterally    Gastrointestinal: no visible abdominal masses, no guarding, no visible hernia    Musculoskeletal: normal tone in all four extremities. No abnormal movements. No pronator drift. No orbit. Symmetric finger tapping. Normal station. Normal regular gait. Normal tandem gait.      Spine:   CERVICAL SPINE:  ROM: severely restricted  MUSCLE SPASM: diffuse  FACET LOADING: bilateral  SPURLING: no  LAURITA / ADOLPH tender: bilateral     Psychiatric: normal judgment and insight. Oriented to person, place, and time.     Neurologic:   Cortical functions: recent and remote memory intact, normal attention span and concentration, speech fluent, adequate fund of knowledge   Cranial nerves: visual fields full, PERRLA, EOMI, symmetric facial strength, hearing intact, palate elevates symmetrically, shoulder shrug 5/5, tongue protrudes midline   Reflexes: 2+ in the upper and lower extremities, no Rodríguez  Sensation: intact to temperature throughout   Coordination: normal finger to nose, heel to shin, tandem gait     Data Review:     I have personally reviewed the referring provider's notes, labs, & imaging made available to me today.      RADIOLOGY STUDIES:  I have personally reviewed the pertinent images performed.       No results found for this or any previous visit.    No results found for: BNP, NA, K, MG, CL, CO2, BUN, CREATININE, GLU, HGBA1C, MG, AST, ALT, ALBUMIN, PROT, BILITOT, CHOL, HDL, LDLCALC, TRIG    No results found for: WBC, HGB, HCT, MCV, PLT    No results found for: TSH        Assessment & Plan:       Problem List Items Addressed This Visit        Neuro    Intractable chronic migraine without aura and without status migrainosus - Primary    Overview     Migraine headaches that began 4-5 years  ago. Headaches are typically unilateral, moderate to severe in intensity, worsen with activity, pounding in quality and associated with sensitivity to light. Gradual progression pattern, lack of red flag features on history, and normal neurological exam are reassuring for primary as opposed to secondary etiology of headaches thus imaging will not be pursued for this history and this exam at this time.    The patient has chronic migraines (G43.719) and suffers from headaches more than 15 days a month lasting more than 4 hours a day with no relief of symptoms despite trying multiple medications including but not limited to amitriptyline. Antihypertensives are contraindicated due to bradycardia at baseline and normal blood pressure. 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 For this reason, Botox is a viable option. Botox treatment was approved for chronic migraines in October 2010. The patient will be an ideal candidate for Botox. We are planning for 3 treatments 3 months apart and aiming for at least 50% improvement in the symptoms. If we see no improvement after 3 treatments, we will discontinue the injections     Previously tried sumatriptan. Rizatriptan but has chest pains. Will try for Nurtec.           Current Assessment & Plan     He is six weeks s/p first Botox. He is already having headache free days and less severe headaches. Rizatriptan effective when needed however, he has chest pains. Will change to Nurtec.            Relevant Medications    rimegepant (NURTEC) 75 mg odt       Orthopedic    Cervical myofascial pain syndrome    Overview     Physical therapy and tizanidine. May benefit from cervical x-rays if no/minimla improvement with Pt given his career.                     Please call our clinic at [phone] or send a message on the [facility] portal  if there are any changes to the plan described below, for example,if you are not contacted for the requested tests, referral(s) within one week, if you are unable to receive the medications prescribed, or if you feel you need to change the treatment course for any reason.     TESTING:  -- none at this time    REFERRALS:  -- none     PREVENTION (use daily regardless of headache):  -- continue Botox     AS-NEEDED TREATMENT (use total no more than 10 days per month unless otherwise stated):  - START Nurtec with next migraine. This dissolves under the tongue and is only taken once in 24 hour time frame.  -- STOP rizatriptan   -- continue tizanidine at night. This is a muscle relaxer and it will also make you potentially sleepy. Start with half a tablet to see how you respond but can take up to a whole tablet if needed      Follow up in 6 weeks (on 4/4/2022) for botox.      Camryn Webb, NP

## 2022-03-02 ENCOUNTER — CLINICAL SUPPORT (OUTPATIENT)
Dept: REHABILITATION | Facility: HOSPITAL | Age: 47
End: 2022-03-02
Payer: OTHER GOVERNMENT

## 2022-03-02 DIAGNOSIS — R26.89 DECREASED FUNCTIONAL MOBILITY: ICD-10-CM

## 2022-03-02 DIAGNOSIS — G89.29 CHRONIC INTRACTABLE HEADACHE, UNSPECIFIED HEADACHE TYPE: ICD-10-CM

## 2022-03-02 DIAGNOSIS — R51.9 CHRONIC INTRACTABLE HEADACHE, UNSPECIFIED HEADACHE TYPE: ICD-10-CM

## 2022-03-02 DIAGNOSIS — M54.2 NECK PAIN: Primary | ICD-10-CM

## 2022-03-02 PROCEDURE — 97110 THERAPEUTIC EXERCISES: CPT | Mod: PN,CQ

## 2022-03-02 PROCEDURE — 97140 MANUAL THERAPY 1/> REGIONS: CPT | Mod: PN,CQ

## 2022-03-02 NOTE — PROGRESS NOTES
[facility]   Physical Therapy Treatment Note     Name: Yonatan Allen  Clinic Number: [id number]    Therapy Diagnosis:   Encounter Diagnoses   Name Primary?    Neck pain Yes    Chronic intractable headache, unspecified headache type     Decreased functional mobility      Physician: Camryn Webb NP    Visit Date: 3/2/2022    Physician Orders: PT Eval and Treat    Medical Diagnosis from Referral:   G43.719 (ICD-10-CM) - Intractable chronic migraine without aura and without status migrainosus   M79.18 (ICD-10-CM) - Cervical myofascial pain syndrome         Evaluation Date: 12/30/2021  Authorization Period Expiration: 6/30/22  Plan of Care Expiration: 3/18/22 (reassessed on 2/16/22)  Visit # / Visits authorized: 11/ 15 (+1 from eval)      Precautions: Standard     PTA Visit #: 4/5      Time In: 4:00  Time Out: 4:50  Total Billable Time: 50 minutes     SUBJECTIVE     Pt reports: headaches have been minimal to none over the last couple weeks. Does feel some increased tightness to the neck and shoulders.     He was somewhat compliant with home exercise program, did stretches but not lying down exerises.  Response to previous treatment: felt okay  Functional change: decreased frequency of headaches     Pain: 1/10 to neck  Location: bilateral tops of shoulders and into neck some      OBJECTIVE     Posture: slight rounded shoulders and fwd head    Treatment     YONATAN received the treatments listed below:      therapeutic exercises to develop strength, ROM, flexibility and posture for 40 minutes including:  Supine pec minor stretch over 1/2 roll x 2 min  Supine pec major stretch over 1/2 roll x 2 min  Seated cervical nods x10, 5 sec hold   Seated cervical retraction x10, 5 sec hold  NP Seated upper trap stretch 3x20 sec  NP Seated levator scap stretch 3x20 sec  Prone over red tball and kneeling on foam sh ext 2x10, 3#  Prone over red tball and kneeling on foam horiz abd 2x10, 2#  (muscle fatigue  "noted)  Squat "W" row B tubing 2x15  Sh ext with B tubing 2x15  Scap clocks large Y sport loop 2x5 ea  ABCs on wall for scap stab x 1 (with red plyo ball)  Push up plus on TM bar 2x10  Lower trap wall slides with lift off x 15 with 3 sec hold   Bruegger's GTB 20 x 3 sec              Suboccipital release with tennis balls, gentle cervical nods x 2 min    Held remaining exercises:  Corner pec stretch 1x20 sec ea (one in each position of high, mid, and low)  Seated VOR gaze stabilization at 5 feet x 10 reps: Nods, rotations (dizziness on L with rotation toward R)     Possible for next time: high plank     manual therapy techniques: Soft tissue Mobilization were applied to the: B UT, cervical paraspinals and suboccipitals for 10 minutes, including:  STM to R levator scap, suboccipital release         Patient Education and Home Exercises     Home Exercises Provided and Patient Education Provided     Education provided:   - moist heat for decreased soreness after dry needling  Pt gave verbal understanding to all education provided    Written Home Exercises Provided: pt instructed to continue previous HEP. Exercises were reviewed and YONATAN was able to demonstrate them prior to the end of the session.  YONATAN demonstrated good  understanding of the education provided. See EMR under Patient Instructions in Notes section for exercises provided during therapy sessions    ASSESSMENT     Yonatan had some tightness to B UT and suboccipitals which loosened with manual techniques. He tolerated progression of exercises well this date without increased tightness to the neck or shoulders. No headache onset throughout session. Improving postural awareness and discussed possible use of posture brace occasionally for increased reminder of posture.    Patient will benefit from continued progression for postural strengthening as able.    YONATAN Is progressing well towards his goals.   Pt prognosis is Good.     Pt will continue to benefit from " skilled outpatient physical therapy to address the deficits listed in the problem list box on initial evaluation, provide pt/family education and to maximize pt's level of independence in the home and community environment.     Pt's spiritual, cultural and educational needs considered and pt agreeable to plan of care and goals.     Anticipated barriers to physical therapy: work schedule    Goals:   Short Term Goals: 4 weeks   1.Report decreased neck and headache pain  <   / =  4  /10  to increase tolerance for ADLs (met)  2. Pt will report decreased headaches to 2x/week for increased tolerance for ADLs (met)  3. Increased R UE strength by 1/3 muscle grade in all deficient planes to increase tolerance for ADL and work activities. (Met)  4.  Increased L UE strength by 1/3 muscle grade in all deficient planes to increase tolerance for ADL and work activities. (met)  5. Pt to tolerate HEP to improve ROM and independence with ADL's (met)        Long Term Goals: 8 weeks   1.Report decreased neck/headache pain  <   / =  2  /10  to increase tolerance for ADLs (progressing)  2.Pt will report decreased headaches to 1x/week for increased tolerance for ADLs (progressing)  3.Increased R UE strength by 1 muscle grade in all deficient planes  to increase tolerance for ADL and work activities. (met)  4. Increased L UE strength by 1 muscle grade in all deficient planes  to increase tolerance for ADL and work activities. (met)  5. Pt to be Independent with HEP to improve ROM and independence with ADL's (met)  6. Increased MMT  for lower traps/middle traps/rhomboids to > or = 4+/5 to increase tolerance for ADL and improve posture. (met)     Unmet goals remain appropriate within 4 weeks.    PLAN     Continue with current plan of care to progress strength, endurance, and mobility toward established therapy goals.  Progress periscap and postural strength as tolerated and perform manual and dry needling as needed.    Karolina Schuster, PTA

## 2022-03-16 ENCOUNTER — TELEPHONE (OUTPATIENT)
Dept: NEUROLOGY | Facility: CLINIC | Age: 47
End: 2022-03-16
Payer: OTHER GOVERNMENT

## 2022-03-16 NOTE — TELEPHONE ENCOUNTER
Rescheduled pt appointment and sent message through portal----- Message from Ryan Balbuena sent at 3/16/2022 12:47 PM CDT -----  Regarding: botox reshedule  Type: Needs Medical Advice    Who Called:  Yonatan Monique Call Back Number: [phone] work // also ok to msg through portal if cant get on phone    Additional Information: needs to reschedule botox from 4/4 to next week or after 4/8 (4/11-4/15)

## 2022-04-01 ENCOUNTER — TELEPHONE (OUTPATIENT)
Dept: NEUROLOGY | Facility: CLINIC | Age: 47
End: 2022-04-01
Payer: OTHER GOVERNMENT

## 2022-04-08 ENCOUNTER — TELEPHONE (OUTPATIENT)
Dept: NEUROLOGY | Facility: CLINIC | Age: 47
End: 2022-04-08
Payer: OTHER GOVERNMENT

## 2022-04-11 ENCOUNTER — PROCEDURE VISIT (OUTPATIENT)
Dept: NEUROLOGY | Facility: CLINIC | Age: 47
End: 2022-04-11
Payer: OTHER GOVERNMENT

## 2022-04-11 ENCOUNTER — TELEPHONE (OUTPATIENT)
Dept: NEUROLOGY | Facility: CLINIC | Age: 47
End: 2022-04-11

## 2022-04-11 VITALS
SYSTOLIC BLOOD PRESSURE: 124 MMHG | HEART RATE: 53 BPM | TEMPERATURE: 98 F | BODY MASS INDEX: 33.22 KG/M2 | WEIGHT: 237.31 LBS | RESPIRATION RATE: 17 BRPM | HEIGHT: 71 IN | DIASTOLIC BLOOD PRESSURE: 78 MMHG

## 2022-04-11 DIAGNOSIS — G43.719 INTRACTABLE CHRONIC MIGRAINE WITHOUT AURA AND WITHOUT STATUS MIGRAINOSUS: Primary | ICD-10-CM

## 2022-04-11 PROCEDURE — 64615 PR CHEMODENERVATION OF MUSCLE FOR CHRONIC MIGRAINE: ICD-10-PCS | Mod: S$PBB,,, | Performed by: NURSE PRACTITIONER

## 2022-04-11 PROCEDURE — 64615 CHEMODENERV MUSC MIGRAINE: CPT | Mod: S$PBB,,, | Performed by: NURSE PRACTITIONER

## 2022-04-11 PROCEDURE — 64615 CHEMODENERV MUSC MIGRAINE: CPT | Mod: PBBFAC,PO | Performed by: NURSE PRACTITIONER

## 2022-04-11 RX ADMIN — ONABOTULINUMTOXINA 200 UNITS: 100 INJECTION, POWDER, LYOPHILIZED, FOR SOLUTION INTRADERMAL; INTRAMUSCULAR at 03:04

## 2022-04-11 NOTE — PROCEDURES

## 2022-05-09 ENCOUNTER — PATIENT MESSAGE (OUTPATIENT)
Dept: SMOKING CESSATION | Facility: CLINIC | Age: 47
End: 2022-05-09
Payer: OTHER GOVERNMENT

## 2022-07-14 ENCOUNTER — PROCEDURE VISIT (OUTPATIENT)
Dept: NEUROLOGY | Facility: CLINIC | Age: 47
End: 2022-07-14
Payer: OTHER GOVERNMENT

## 2022-07-14 VITALS
HEART RATE: 66 BPM | DIASTOLIC BLOOD PRESSURE: 80 MMHG | HEIGHT: 71 IN | BODY MASS INDEX: 33.06 KG/M2 | WEIGHT: 236.13 LBS | TEMPERATURE: 98 F | RESPIRATION RATE: 18 BRPM | SYSTOLIC BLOOD PRESSURE: 134 MMHG

## 2022-07-14 DIAGNOSIS — G43.719 INTRACTABLE CHRONIC MIGRAINE WITHOUT AURA AND WITHOUT STATUS MIGRAINOSUS: Primary | ICD-10-CM

## 2022-07-14 PROCEDURE — 64615 CHEMODENERV MUSC MIGRAINE: CPT | Mod: PBBFAC,PO | Performed by: NURSE PRACTITIONER

## 2022-07-14 PROCEDURE — 64615 PR CHEMODENERVATION OF MUSCLE FOR CHRONIC MIGRAINE: ICD-10-PCS | Mod: S$PBB,,, | Performed by: NURSE PRACTITIONER

## 2022-07-14 PROCEDURE — 64615 CHEMODENERV MUSC MIGRAINE: CPT | Mod: S$PBB,,, | Performed by: NURSE PRACTITIONER

## 2022-07-14 RX ADMIN — ONABOTULINUMTOXINA 200 UNITS: 100 INJECTION, POWDER, LYOPHILIZED, FOR SOLUTION INTRADERMAL; INTRAMUSCULAR at 03:07

## 2022-07-14 NOTE — PROCEDURES

## 2022-08-25 ENCOUNTER — OFFICE VISIT (OUTPATIENT)
Dept: NEUROLOGY | Facility: CLINIC | Age: 47
End: 2022-08-25
Payer: OTHER GOVERNMENT

## 2022-08-25 DIAGNOSIS — G43.719 INTRACTABLE CHRONIC MIGRAINE WITHOUT AURA AND WITHOUT STATUS MIGRAINOSUS: ICD-10-CM

## 2022-08-25 PROCEDURE — 99213 OFFICE O/P EST LOW 20 MIN: CPT | Mod: 95,,, | Performed by: NURSE PRACTITIONER

## 2022-08-25 PROCEDURE — 99213 PR OFFICE/OUTPT VISIT, EST, LEVL III, 20-29 MIN: ICD-10-PCS | Mod: 95,,, | Performed by: NURSE PRACTITIONER

## 2022-08-25 NOTE — PATIENT INSTRUCTIONS
Please call our clinic at [phone] or send a message on the [facility] portal if there are any changes to the plan described below, for example,if you are not contacted for the requested tests, referral(s) within one week, if you are unable to receive the medications prescribed, or if you feel you need to change the treatment course for any reason.     TESTING:  -- none at this time    REFERRALS:  -- none     PREVENTION (use daily regardless of headache):  -- continue Botox     AS-NEEDED TREATMENT (use total no more than 10 days per month unless otherwise stated):  -- continue Nurtec with next migraine. This dissolves under the tongue and is only taken once in 24 hour time frame.  -- continue tizanidine at night. This is a muscle relaxer and it will also make you potentially sleepy. Start with half a tablet to see how you respond but can take up to a whole tablet if needed

## 2022-08-25 NOTE — PROGRESS NOTES
Date of service: 8/25/2022  Referring provider: No ref. provider found    Subjective:      Chief complaint: Headache (Follow up)       Patient ID: Yonatan Allen is a 46 y.o. male with who presents for follow up of headache    History of Present Illness    INTERVAL HISTORY 8/25/22  The patient location is: work  The chief complaint leading to consultation is: follow up  Visit type: audiovisual  Face to Face time with patient: 15  20 minutes of total time spent on the encounter, which includes face to face time and non-face to face time preparing to see the patient (eg, review of tests), Obtaining and/or reviewing separately obtained history, Documenting clinical information in the electronic or other health record, Independently interpreting results (not separately reported) and communicating results to the patient/family/caregiver, or Care coordination (not separately reported).   Each patient to whom he or she provides medical services by telemedicine is:  (1) informed of the relationship between the physician and patient and the respective role of any other health care provider with respect to management of the patient; and (2) notified that he or she may decline to receive medical services by telemedicine and may withdraw from such care at any time.    Notes:     Last office visit was six months ago and he had his third Botox six weeks ago.     Today he reports he is much better. He reports maybe one headache day per week with rare migraine attack. Current pain 0 with range 0-9. He takes Nurtec as needed which helps. Otherwise information below is reviewed and verified with no changes made     INTERVAL HISTORY 2/21/22    Last visit was about two months and at that time plan was to start Botox for chronic migraine. He was also referred for sleep evaluation and PT. Added rizatriptan for acute escalations. First Botox was 1/10/22.    Today he reports he is much better. He reports less headaches. When present, headaches  are sides and front. Current pain 3 with range 1-9. He has 3-4 headache days per week. He takes rizatriptan and tylenol 1-2 days per week. He is doing PT at the [place] location twice per week which has been very helpful. No side effects to Botox noted.   Otherwise information below is reviewed and verified with no changes made unless noted.    ORIGINAL HEADACHE HISTORY - 12/15/21  Age at onset and course over time: about 4 years ago began with migraine type headaches. Frequency has increased over time.   He has been diagnosed with TBI  Family history - mom and sister with migraines  Last eye exam - 7-8 months ago  Location: sides, front  Quality:  [x] pressure [] tight [x] throbbing [] sharp [x] stabbing   Severity: current 4 with range 6-10  Duration: hours to constant  Frequency: daily, rare headache free days  Headaches awaken at night?: yes, 1-2 nights per week   Worst time of day: mid-day, evening  Associated with: [x] photophobia []  phonophobia [] osmophobia [] blurred vision  [] double vision [] loss of appetite [x] nausea [] vomiting [x] dizziness [x] vertigo  [] tinnitus [x] irritability [] sinus pressure [] problems with concentration   [x] neck tightness   Alleviated by:  [] sleep [x] darkness [] massage [] heat [x] ice [x] medication  Exacerbated by:  [] fatigue [x] light [] noise [] smells [] coughing [] sneezing  [] bending over [] ovulation [] menses [] alcohol [] change in weather []  stress  Ipsilateral autonomic: [] nasal congestion [] lacrimation [] ptosis [] injection [] edema [] foreign body sensation [] ear fullness   ICP:  [x] transient visual obscurations - if he stands too quickly, he gets tunnel vision occasionally  [] tinnitus   [] positional headache  [x] non-positional   Sleep habits: sleep apnea, wears CPAP  Caffeine intake: 1-2 cups per day  Gyn status (if female): n/a    Current acute treatment:  Rizatriptan  Tizanidine     Current prevention:  Amitriptyline - on a couple years  ago  Botox - first 1/10/22    Previously tried/failed acute treatment:  Tylenol  ASA  Naproxen  Sumatriptan    Previously tried/failed preventative treatment:  None  Blood pressure and heart rate will not tolerate antihypertensive  Medications must be weight neutral and 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      metFORMIN (GLUCOPHAGE-XR) 500 MG ER 24hr tablet Take 500 mg by mouth daily with breakfast.      omeprazole (PRILOSEC) 20 MG capsule Take 20 mg by mouth once daily.      rimegepant (NURTEC) 75 mg odt Take 1 tablet (75 mg total) by mouth daily as needed for Migraine. Place ODT tablet on the tongue; alternatively the ODT tablet may be placed under the tongue 8 tablet 11    rosuvastatin (CRESTOR) 20 MG tablet Take 20 mg by mouth once daily.      tiZANidine (ZANAFLEX) 4 MG tablet Half or full tablet by mouth at night as needed for muscle spasm 30 tablet 11    triamcinolone acetonide 0.1% (KENALOG) 0.1 % cream        Current Facility-Administered Medications   Medication Dose Route Frequency Provider Last Rate Last Admin    onabotulinumtoxina injection 200 Units  200 Units Intramuscular q12 weeks Camryn Webb NP   200 Units at 07/14/22 1526       Past Medical History  Past Medical History:   Diagnosis Date    Headache        Past Surgical History  History reviewed. No pertinent surgical history.    Family History  History reviewed. No pertinent family history.    Social History  Social History     Socioeconomic History    Marital status:    Tobacco Use    Smoking status: Current Some Day Smoker     Types: Cigars    Smokeless tobacco: Never Used        Review of Systems  14-point review of systems as follows:   No check honorio indicates NEGATIVE response    Constitutional: [] weight loss, [] change to appetite   Eyes: [] change in vision, [] double vision   Ears, nose, mouth, throat: [] frequent nose bleeds, [] ringing in the ears   Respiratory: [] cough, [] wheezing   Cardiovascular: [x] chest pain, [] palpitations   Gastrointestinal: [] jaundice, [] nausea/vomiting   Genitourinary: [] incontinence, [] burning with urination   Hematologic/lymphatic: [] easy bruising/bleeding, [] night sweats   Neurological: [] numbness, [] weakness   Endocrine: [] fatigue, [] heat/cold intolerance   Allergy/Immunologic: [] fevers, [] chills   Musculoskeletal: [] muscle pain, [] joint pain   Psychiatric: [] thoughts of harming self/others, [] depression   Integumentary: [] rashes, [] sores that do not heal     Objective:        There were no vitals filed for this visit.  There is no height or weight on file to calculate BMI.    8/25/22  Poor connectivity on video visit.     12/15/21  Constitutional: appears in no acute distress, well-developed, well-nourished     Eyes: normal conjunctiva, PERRLA    Ears, nose, mouth, throat: external appearance of ears and nose normal, hearing intact     Cardiovascular: regular rate and rhythm, no murmurs appreciated    Respiratory: unlabored respirations, breath sounds normal bilaterally    Gastrointestinal: no visible abdominal masses, no guarding, no visible hernia    Musculoskeletal: normal tone in all four extremities. No abnormal movements. No pronator drift. No orbit. Symmetric finger tapping. Normal station. Normal regular gait. Normal tandem gait.      Spine:   CERVICAL SPINE:  ROM: severely restricted  MUSCLE SPASM: diffuse  FACET LOADING: bilateral  SPURLING: no  LAURITA / ADOLPH tender: bilateral     Psychiatric: normal judgment and insight. Oriented to person, place, and time.     Neurologic:   Cortical functions: recent and remote memory intact, normal attention span and concentration, speech fluent, adequate fund of knowledge   Cranial  nerves: visual fields full, PERRLA, EOMI, symmetric facial strength, hearing intact, palate elevates symmetrically, shoulder shrug 5/5, tongue protrudes midline   Reflexes: 2+ in the upper and lower extremities, no Rodríguez  Sensation: intact to temperature throughout   Coordination: normal finger to nose, heel to shin, tandem gait     Data Review:     I have personally reviewed the referring provider's notes, labs, & imaging made available to me today.      RADIOLOGY STUDIES:  I have personally reviewed the pertinent images performed.       No results found for this or any previous visit.    No results found for: BNP, NA, K, MG, CL, CO2, BUN, CREATININE, GLU, HGBA1C, MG, AST, ALT, ALBUMIN, PROT, BILITOT, CHOL, HDL, LDLCALC, TRIG    No results found for: WBC, HGB, HCT, MCV, PLT    No results found for: TSH        Assessment & Plan:       Problem List Items Addressed This Visit        Neuro    Intractable chronic migraine without aura and without status migrainosus    Overview     Migraine headaches that began 4-5 years ago. Headaches are typically unilateral, moderate to severe in intensity, worsen with activity, pounding in quality and associated with sensitivity to light. Gradual progression pattern, lack of red flag features on history, and normal neurological exam are reassuring for primary as opposed to secondary etiology of headaches thus imaging will not be pursued for this history and this exam at this time.    The patient has chronic migraines (G43.719) and suffers from headaches more than 15 days a month lasting more than 4 hours a day with no relief of symptoms despite trying multiple medications including but not limited to amitriptyline. Antihypertensives are contraindicated due to bradycardia at baseline and normal blood pressure. 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 For this reason, Botox is a viable option. Botox treatment was approved for chronic migraines in October 2010. The patient will be an ideal candidate for Botox. We are planning for 3 treatments 3 months apart and aiming for at least 50% improvement in the symptoms. If we see no improvement after 3 treatments, we will discontinue the injections     Previously tried sumatriptan. Rizatriptan but has chest pains. Will try for Nurtec.           Current Assessment & Plan     He is s/p third Botox and has had an excellent response. He has gone from daily headache to now one mild headache per week with rare migraine attack. When needed, Nurtec is effective. Plan to continue Botox.                      Please call our clinic at [phone] or send a message on the [facility] portal if there are any changes to the plan described below, for example,if you are not contacted for the requested tests, referral(s) within one week, if you are unable to receive the medications prescribed, or if you feel you need to change the treatment course for any reason.     TESTING:  -- none at this time    REFERRALS:  -- none     PREVENTION (use daily regardless of headache):  -- continue Botox     AS-NEEDED TREATMENT (use total no more than 10 days per month unless otherwise stated):  -- continue Nurtec with next migraine. This dissolves under the tongue and is only taken once in 24 hour time frame.  -- continue tizanidine at night. This is a muscle relaxer and it will also make you potentially sleepy. Start with half a tablet to see how you respond but can take up to a whole tablet if needed      Follow up in 6 weeks (on 10/6/2022) for botox.      Camryn Webb NP

## 2022-10-06 ENCOUNTER — PROCEDURE VISIT (OUTPATIENT)
Dept: NEUROLOGY | Facility: CLINIC | Age: 47
End: 2022-10-06
Payer: OTHER GOVERNMENT

## 2022-10-06 VITALS
HEIGHT: 71 IN | WEIGHT: 236 LBS | HEART RATE: 58 BPM | SYSTOLIC BLOOD PRESSURE: 156 MMHG | BODY MASS INDEX: 33.04 KG/M2 | DIASTOLIC BLOOD PRESSURE: 90 MMHG | RESPIRATION RATE: 17 BRPM

## 2022-10-06 DIAGNOSIS — G43.719 INTRACTABLE CHRONIC MIGRAINE WITHOUT AURA AND WITHOUT STATUS MIGRAINOSUS: Primary | ICD-10-CM

## 2022-10-06 PROCEDURE — 64615 CHEMODENERV MUSC MIGRAINE: CPT | Mod: PBBFAC,PO | Performed by: NURSE PRACTITIONER

## 2022-10-06 PROCEDURE — 64615 CHEMODENERV MUSC MIGRAINE: CPT | Mod: S$PBB,,, | Performed by: NURSE PRACTITIONER

## 2022-10-06 PROCEDURE — 64615 PR CHEMODENERVATION OF MUSCLE FOR CHRONIC MIGRAINE: ICD-10-PCS | Mod: S$PBB,,, | Performed by: NURSE PRACTITIONER

## 2022-10-06 RX ADMIN — ONABOTULINUMTOXINA 200 UNITS: 100 INJECTION, POWDER, LYOPHILIZED, FOR SOLUTION INTRADERMAL; INTRAMUSCULAR at 04:10

## 2022-10-06 NOTE — PROCEDURES
Procedures   A time out was conducted just before the start of the procedure to verify the correct patient and procedure, procedure location, and all relevant critical information.      Conventional methods of treatment such as multiple medications, both on and off label have been tried including: amitrptyline     The patient has been unresponsive and refractory.The patient meets criteria for chronic headaches according to the ICHD-II, the patient has more than 15 headaches a month which last for more than 4 hours a day.     Botox session number: 4  Last session was 12 weeks ago and resulted in improvement of: 80%     I am aiming for at least 50%  improvement in the patient's symptoms. Frequency of treatment is every 3 months unless no response to the treatments, at which time we will discontinue the injections.      DESCRIPTION OF PROCEDURE: After obtaining informed consent and under aseptic technique, a total of 155 units of botulinum toxin type A were injected in the following muscles:      -- Procerus 5 units  --  5 units bilaterally  -- Frontalis 20 units  -- Temporalis 20 units bilaterally  -- Occipitalis 15 units bilaterally  -- Upper cervical paraspinals 10 units bilaterally  -- Trapezius 15 units bilaterally.      The patient tolerated the procedure well. There were no complications. The patient was given a prescription for repeat treatment in 12 weeks      Unavoidable waste 45 units    RAJ Stokes

## 2023-01-04 ENCOUNTER — TELEPHONE (OUTPATIENT)
Dept: NEUROLOGY | Facility: CLINIC | Age: 48
End: 2023-01-04
Payer: OTHER GOVERNMENT

## 2023-01-05 ENCOUNTER — PATIENT MESSAGE (OUTPATIENT)
Dept: NEUROLOGY | Facility: CLINIC | Age: 48
End: 2023-01-05
Payer: OTHER GOVERNMENT

## 2023-01-05 ENCOUNTER — TELEPHONE (OUTPATIENT)
Dept: NEUROLOGY | Facility: CLINIC | Age: 48
End: 2023-01-05
Payer: OTHER GOVERNMENT

## 2023-01-12 ENCOUNTER — TELEPHONE (OUTPATIENT)
Dept: NEUROLOGY | Facility: CLINIC | Age: 48
End: 2023-01-12
Payer: OTHER GOVERNMENT

## 2023-01-17 ENCOUNTER — TELEPHONE (OUTPATIENT)
Dept: NEUROLOGY | Facility: CLINIC | Age: 48
End: 2023-01-17
Payer: OTHER GOVERNMENT

## 2023-01-17 NOTE — TELEPHONE ENCOUNTER
Explained to patient procedure visits must be scheduled by clinic staff.  Rescheduled patient and confirmed date and time.

## 2023-01-17 NOTE — TELEPHONE ENCOUNTER
----- Message from Ryan Balbuena sent at 1/17/2023  9:48 AM CST -----  Regarding: botox question  Type:  Reschedule Botox    Caller is requesting a BOTOX appointment.      Name of Caller:  Yonatan    When is the first available appointment?  Dept Book    Procedure:  Botox    Best Call Back Number:  [phone]     Additional Information:  pt wants to check to make sure referral is at office for botox appt. Pt scheduled appt in myChart. Please discuss.

## 2023-01-19 ENCOUNTER — TELEPHONE (OUTPATIENT)
Dept: NEUROLOGY | Facility: CLINIC | Age: 48
End: 2023-01-19

## 2023-01-19 ENCOUNTER — PROCEDURE VISIT (OUTPATIENT)
Dept: NEUROLOGY | Facility: CLINIC | Age: 48
End: 2023-01-19
Payer: OTHER GOVERNMENT

## 2023-01-19 VITALS
BODY MASS INDEX: 33.04 KG/M2 | RESPIRATION RATE: 17 BRPM | SYSTOLIC BLOOD PRESSURE: 129 MMHG | HEART RATE: 55 BPM | WEIGHT: 236 LBS | DIASTOLIC BLOOD PRESSURE: 81 MMHG | HEIGHT: 71 IN

## 2023-01-19 DIAGNOSIS — G43.719 INTRACTABLE CHRONIC MIGRAINE WITHOUT AURA AND WITHOUT STATUS MIGRAINOSUS: Primary | ICD-10-CM

## 2023-01-19 PROCEDURE — 64615 CHEMODENERV MUSC MIGRAINE: CPT | Mod: PBBFAC,PO | Performed by: NURSE PRACTITIONER

## 2023-01-19 PROCEDURE — 64615 CHEMODENERV MUSC MIGRAINE: CPT | Mod: S$PBB,,, | Performed by: NURSE PRACTITIONER

## 2023-01-19 PROCEDURE — 64615 PR CHEMODENERVATION OF MUSCLE FOR CHRONIC MIGRAINE: ICD-10-PCS | Mod: S$PBB,,, | Performed by: NURSE PRACTITIONER

## 2023-01-19 RX ADMIN — ONABOTULINUMTOXINA 200 UNITS: 100 INJECTION, POWDER, LYOPHILIZED, FOR SOLUTION INTRADERMAL; INTRAMUSCULAR at 03:01

## 2023-01-19 NOTE — PROCEDURES
Procedures   A time out was conducted just before the start of the procedure to verify the correct patient and procedure, procedure location, and all relevant critical information.      Conventional methods of treatment such as multiple medications, both on and off label have been tried including: amitrptyline     The patient has been unresponsive and refractory.The patient meets criteria for chronic headaches according to the ICHD-II, the patient has more than 15 headaches a month which last for more than 4 hours a day.     Botox session number: 5  Last session was 12 weeks ago and resulted in improvement of: 80%     I am aiming for at least 50%  improvement in the patient's symptoms. Frequency of treatment is every 3 months unless no response to the treatments, at which time we will discontinue the injections.      DESCRIPTION OF PROCEDURE: After obtaining informed consent and under aseptic technique, a total of 155 units of botulinum toxin type A were injected in the following muscles:      -- Procerus 5 units  --  5 units bilaterally  -- Frontalis 20 units  -- Temporalis 20 units bilaterally  -- Occipitalis 15 units bilaterally  -- Upper cervical paraspinals 10 units bilaterally  -- Trapezius 15 units bilaterally.      The patient tolerated the procedure well. There were no complications. The patient was given a prescription for repeat treatment in 12 weeks      Unavoidable waste 45 units    RAJ Stokes

## 2023-01-20 NOTE — TELEPHONE ENCOUNTER
Nurtec PA:   Prior authorization approved Case ID: [id number]      Payer:   [employer]   CaseId:[id number];Status:Approved;Review Type:Prior Auth;Coverage Start Date:12/20/2022;Coverage End Date:12/31/2099;   Approval Details    Authorized from December 20, 2022 to December 31, 2099

## 2023-04-10 ENCOUNTER — TELEPHONE (OUTPATIENT)
Dept: NEUROLOGY | Facility: CLINIC | Age: 48
End: 2023-04-10
Payer: OTHER GOVERNMENT

## 2023-04-10 NOTE — TELEPHONE ENCOUNTER
----- Message from Spencer Nielsen sent at 4/10/2023 10:29 AM CDT -----  Contact: Self  Type:  Sooner Appointment Request    Caller is requesting a sooner appointment.  Caller declined first available appointment listed below.  Caller will not accept being placed on the waitlist and is requesting a message be sent to doctor.    Name of Caller:  Patient  When is the first available appointment?  N/a  Symptoms:  r/s  Best Call Back Number:  [phone]   Additional Information:  Prefers myc communication, would like appt moved to 4/14

## 2023-04-14 ENCOUNTER — PROCEDURE VISIT (OUTPATIENT)
Dept: NEUROLOGY | Facility: CLINIC | Age: 48
End: 2023-04-14
Payer: OTHER GOVERNMENT

## 2023-04-14 VITALS
WEIGHT: 243 LBS | BODY MASS INDEX: 34.02 KG/M2 | DIASTOLIC BLOOD PRESSURE: 80 MMHG | HEIGHT: 71 IN | RESPIRATION RATE: 17 BRPM | SYSTOLIC BLOOD PRESSURE: 126 MMHG | HEART RATE: 76 BPM

## 2023-04-14 DIAGNOSIS — G43.719 INTRACTABLE CHRONIC MIGRAINE WITHOUT AURA AND WITHOUT STATUS MIGRAINOSUS: Primary | ICD-10-CM

## 2023-04-14 PROCEDURE — 64615 CHEMODENERV MUSC MIGRAINE: CPT | Mod: PBBFAC,PO | Performed by: NURSE PRACTITIONER

## 2023-04-14 PROCEDURE — 64615 PR CHEMODENERVATION OF MUSCLE FOR CHRONIC MIGRAINE: ICD-10-PCS | Mod: S$PBB,,, | Performed by: NURSE PRACTITIONER

## 2023-04-14 PROCEDURE — 64615 CHEMODENERV MUSC MIGRAINE: CPT | Mod: S$PBB,,, | Performed by: NURSE PRACTITIONER

## 2023-04-14 RX ADMIN — ONABOTULINUMTOXINA 200 UNITS: 100 INJECTION, POWDER, LYOPHILIZED, FOR SOLUTION INTRADERMAL; INTRAMUSCULAR at 01:04

## 2023-04-14 NOTE — PROCEDURES
Procedures   A time out was conducted just before the start of the procedure to verify the correct patient and procedure, procedure location, and all relevant critical information.      Conventional methods of treatment such as multiple medications, both on and off label have been tried including: amitrptyline     The patient has been unresponsive and refractory.The patient meets criteria for chronic headaches according to the ICHD-II, the patient has more than 15 headaches a month which last for more than 4 hours a day.     Botox session number: 6  Last session was 12 weeks ago and resulted in improvement of: 80%     I am aiming for at least 50%  improvement in the patient's symptoms. Frequency of treatment is every 3 months unless no response to the treatments, at which time we will discontinue the injections.      DESCRIPTION OF PROCEDURE: After obtaining informed consent and under aseptic technique, a total of 155 units of botulinum toxin type A were injected in the following muscles:      -- Procerus 5 units  --  5 units bilaterally  -- Frontalis 20 units  -- Temporalis 20 units bilaterally  -- Occipitalis 15 units bilaterally  -- Upper cervical paraspinals 10 units bilaterally  -- Trapezius 15 units bilaterally.      The patient tolerated the procedure well. There were no complications. The patient was given a prescription for repeat treatment in 12 weeks      Unavoidable waste 45 units    RAJ Stokes

## 2023-06-23 ENCOUNTER — TELEPHONE (OUTPATIENT)
Dept: NEUROLOGY | Facility: CLINIC | Age: 48
End: 2023-06-23
Payer: OTHER GOVERNMENT

## 2023-06-30 ENCOUNTER — TELEPHONE (OUTPATIENT)
Dept: NEUROLOGY | Facility: CLINIC | Age: 48
End: 2023-06-30
Payer: OTHER GOVERNMENT

## 2023-07-05 ENCOUNTER — TELEPHONE (OUTPATIENT)
Dept: NEUROLOGY | Facility: CLINIC | Age: 48
End: 2023-07-05
Payer: OTHER GOVERNMENT

## 2023-07-06 ENCOUNTER — TELEPHONE (OUTPATIENT)
Dept: NEUROLOGY | Facility: CLINIC | Age: 48
End: 2023-07-06
Payer: OTHER GOVERNMENT

## 2023-07-06 NOTE — TELEPHONE ENCOUNTER
Called patient and notified Botox not approved and need new referral from [facility]. Informed him that 2 messages on this was sent on 6/23 at 6/30. Requested that patient obtain this and then call back to reschedule once that is completed. Verbalized understanding.